# Patient Record
Sex: FEMALE | Race: WHITE | NOT HISPANIC OR LATINO
[De-identification: names, ages, dates, MRNs, and addresses within clinical notes are randomized per-mention and may not be internally consistent; named-entity substitution may affect disease eponyms.]

---

## 2017-07-07 ENCOUNTER — TRANSCRIPTION ENCOUNTER (OUTPATIENT)
Age: 32
End: 2017-07-07

## 2018-12-17 ENCOUNTER — TRANSCRIPTION ENCOUNTER (OUTPATIENT)
Age: 33
End: 2018-12-17

## 2020-02-25 ENCOUNTER — TRANSCRIPTION ENCOUNTER (OUTPATIENT)
Age: 35
End: 2020-02-25

## 2021-06-22 ENCOUNTER — TRANSCRIPTION ENCOUNTER (OUTPATIENT)
Age: 36
End: 2021-06-22

## 2021-09-20 ENCOUNTER — TRANSCRIPTION ENCOUNTER (OUTPATIENT)
Age: 36
End: 2021-09-20

## 2021-09-25 ENCOUNTER — TRANSCRIPTION ENCOUNTER (OUTPATIENT)
Age: 36
End: 2021-09-25

## 2021-11-07 ENCOUNTER — EMERGENCY (EMERGENCY)
Facility: HOSPITAL | Age: 36
LOS: 1 days | Discharge: HOME | End: 2021-11-07
Attending: EMERGENCY MEDICINE
Payer: COMMERCIAL

## 2021-11-07 VITALS
SYSTOLIC BLOOD PRESSURE: 109 MMHG | HEIGHT: 63 IN | WEIGHT: 188.05 LBS | DIASTOLIC BLOOD PRESSURE: 71 MMHG | RESPIRATION RATE: 16 BRPM | TEMPERATURE: 98 F | OXYGEN SATURATION: 95 % | HEART RATE: 88 BPM

## 2021-11-07 VITALS
SYSTOLIC BLOOD PRESSURE: 105 MMHG | HEART RATE: 84 BPM | TEMPERATURE: 98 F | OXYGEN SATURATION: 98 % | DIASTOLIC BLOOD PRESSURE: 67 MMHG | RESPIRATION RATE: 16 BRPM

## 2021-11-07 LAB
APPEARANCE UR: ABNORMAL
BACTERIA # UR AUTO: ABNORMAL
BILIRUB UR-MCNC: NEGATIVE — SIGNIFICANT CHANGE UP
COLOR SPEC: YELLOW — SIGNIFICANT CHANGE UP
DIFF PNL FLD: ABNORMAL
EPI CELLS # UR: 16 /HPF — HIGH
GLUCOSE UR QL: NEGATIVE — SIGNIFICANT CHANGE UP
KETONES UR-MCNC: NEGATIVE — SIGNIFICANT CHANGE UP
LEUKOCYTE ESTERASE UR-ACNC: ABNORMAL
NITRITE UR-MCNC: NEGATIVE — SIGNIFICANT CHANGE UP
PH UR: 6 — SIGNIFICANT CHANGE UP (ref 5–8)
PROT UR-MCNC: ABNORMAL
RBC CASTS # UR COMP ASSIST: 3 /HPF — SIGNIFICANT CHANGE UP (ref 0–4)
SP GR SPEC: 1.03 — HIGH (ref 1.01–1.02)
UROBILINOGEN FLD QL: ABNORMAL
WBC UR QL: 37 /HPF — HIGH (ref 0–5)

## 2021-11-07 PROCEDURE — 84702 CHORIONIC GONADOTROPIN TEST: CPT

## 2021-11-07 PROCEDURE — 93975 VASCULAR STUDY: CPT | Mod: 26

## 2021-11-07 PROCEDURE — 76830 TRANSVAGINAL US NON-OB: CPT | Mod: 26

## 2021-11-07 PROCEDURE — 80053 COMPREHEN METABOLIC PANEL: CPT

## 2021-11-07 PROCEDURE — 86850 RBC ANTIBODY SCREEN: CPT

## 2021-11-07 PROCEDURE — 85025 COMPLETE CBC W/AUTO DIFF WBC: CPT

## 2021-11-07 PROCEDURE — 93975 VASCULAR STUDY: CPT

## 2021-11-07 PROCEDURE — 99284 EMERGENCY DEPT VISIT MOD MDM: CPT | Mod: 25

## 2021-11-07 PROCEDURE — 36415 COLL VENOUS BLD VENIPUNCTURE: CPT

## 2021-11-07 PROCEDURE — 76830 TRANSVAGINAL US NON-OB: CPT

## 2021-11-07 PROCEDURE — 86901 BLOOD TYPING SEROLOGIC RH(D): CPT

## 2021-11-07 PROCEDURE — 99285 EMERGENCY DEPT VISIT HI MDM: CPT

## 2021-11-07 PROCEDURE — 86900 BLOOD TYPING SEROLOGIC ABO: CPT

## 2021-11-07 PROCEDURE — 81001 URINALYSIS AUTO W/SCOPE: CPT

## 2021-11-07 NOTE — ED PROVIDER NOTE - NSFOLLOWUPINSTRUCTIONS_ED_ALL_ED_FT
You were seen in the ED for lower abdominal pain.   The following labs/imaging were obtained: see attached (if applicable)  Continue home medications (if any).   Take Acetaminophen (Tylenol 1,000mg each 6-8hrs) AND /OR Ibuprofen (Motrin 600mg each 6-8hrs) for pain. Take Ibuprofen with meals.  Return to the ED if you develop fever, inability to tolerate fluids,  worsening or new concerning symptoms.  Follow up with your gynecologist in 2-3 days.   Discussed with pt results of work up, strict return precautions, and need for follow up.  Pt expressed understanding and agrees with plan. You were seen in the ED for lower abdominal pain.   The following labs/imaging were obtained: see attached (if applicable)  Continue home medications (if any).   Take Acetaminophen (Tylenol 1,000mg each 6-8hrs) AND /OR Ibuprofen (Motrin 600mg each 6-8hrs) for pain. Take Ibuprofen with meals.  Return to the ED if you develop fever, inability to tolerate fluids,  worsening or new concerning symptoms.  Follow up with your ob / gynecologist in 2-3 days.   Discussed with pt results of work up, strict return precautions, and need for follow up.  Pt expressed understanding and agrees with plan.

## 2021-11-07 NOTE — ED PROVIDER NOTE - NS ED ROS FT
GENERAL: No fever or chills  EYES: no change in vision  HEENT: no trouble swallowing or speaking  CARDIAC: no chest pain or palpitations   PULMONARY: no cough or SOB  GI: no abdominal pain, nausea, vomiting, diarrhea, or constipation   : see hpi   SKIN: no rashes  NEURO: no headache, numbness, or weakness.  MSK: No joint pain

## 2021-11-07 NOTE — ED PROVIDER NOTE - PATIENT PORTAL LINK FT
You can access the FollowMyHealth Patient Portal offered by [facility] by registering at the following website: [email or web address]. By joining [facility]’s FollowMyHealth portal, you will also be able to view your health information using other applications (apps) compatible with our system.

## 2021-11-07 NOTE — ED PROVIDER NOTE - PHYSICAL EXAMINATION
Gen: AAOx3, non-toxic  Head: NCAT  HEENT: EOMI, oral mucosa moist, normal conjunctiva  Lung: CTAB, no respiratory distress, no wheezes/rhonchi/rales B/L, speaking in full sentences  CV: RRR, no murmurs, rubs or gallops  Abd: suprapubic and Llq ttp, no guarding, no CVA tenderness  MSK: no visible deformities  Neuro: No focal sensory or motor deficits, normal CN exam   Skin: Warm, well perfused, no rash  Psych: normal affect.

## 2021-11-07 NOTE — CONSULT NOTE ADULT - ASSESSMENT
37yo , LMP ~Mid Aug here w/ c/o sudden onset suprpubic/LLQ abdominal pain. No cyst with a small amount of free fluid demonstrated on US.  Patient with overall benign abdomen although with some mild tenderness to palpation. Low concern for torsion at this time given benign abdomen and clinical picture with overall well apperaing, well mentating patient. Likely ruptured cyst.     -no acute gyn intervention  -would recommended UA to r/u UTI    Attila PGY2  d/w Dr. Velez         35yo , LMP ~Mid Aug here w/ c/o sudden onset suprpubic/LLQ abdominal pain. No cyst with a small amount of free fluid demonstrated on US.  Patient with overall benign abdomen although with some mild tenderness to palpation. Low concern for torsion at this time given benign abdomen and clinical picture with overall well apperaing, well mentating patient. Likely ruptured cyst.     -no acute gyn intervention  -would recommended UA to r/u UTI    Attila PGY2      TO BE DISCUSSED WITH ATTENDING         35yo , LMP ~Mid Aug here w/ c/o sudden onset suprpubic/LLQ abdominal pain. No cyst with a small amount of free fluid demonstrated on US.  Patient with overall benign abdomen although with some mild tenderness to palpation. Low concern for torsion at this time given benign abdomen and clinical picture with overall well apperaing, well mentating patient. Likely ruptured cyst.     -no acute gyn intervention  -would recommended UA to r/u UTI    Attila PGY2  d/w Dr. Soto

## 2021-11-07 NOTE — ED PROVIDER NOTE - CLINICAL SUMMARY MEDICAL DECISION MAKING FREE TEXT BOX
35 yo F, IVF with  last month, transferred from [facility] for gyn evaluation after initially presenting there with suprapubic ttp and vaginal spotting. Ddx retained products of conception vs ectopic pregnancy vs ovarian torsion. Basic labs, US, UA, hcg, gyn consult

## 2021-11-07 NOTE — ED PROVIDER NOTE - ATTENDING CONTRIBUTION TO CARE
Attending MD Fontanez:  I personally have seen and examined this patient.  Resident note reviewed and agree on plan of care and except where noted.  See HPI, PE, and MDM for details.      Pt transferred from OSH for evaluation of pelvic pain, undergoing IVF. Nontender abdomen here, TVUS without ovarian torsion. Suspected rupture ovarian cyst as etiology of pain per GYN evaluation. Patient's pain improved upon discharge. Instructed to follow up with GYN as outpatient.

## 2021-11-07 NOTE — ED ADULT NURSE NOTE - OBJECTIVE STATEMENT
37 YO female transfer from [place] for OB/GNY consult. Patient reports she has spontaneous  approx 1 month ago and has had continues lower abdominal pain and spotting. Patient denies saturating pads or passing clots. Patient went to [facility], was told that they do not perform transvag. ultrasounds at night and not capable of OB/GYN consult at that time. Patient is A&OX4, denies chest pain, SOB, HA, N/V/D, abdominal pain, fever/chills, urinary symptoms, hematuria, weakness, dizziness, numbness, tinging. Peripheral pulses present b/l. Skin warm, dry and pink. Pt placed in position of comfort. Pt educated on call bell system and provided call bell. Bed in lowest position, wheels locked, appropriate side rails raised. Pt denies needs at this time. 35 YO female transfer from [place] for OB/GNY consult. Patient reports she has spontaneous  approx 1 month ago was spotting for a few weeks with associated pain and spontaneously stopped. Patient reports that yesterday  lower abdominal pain and spotting returned. Patient denies saturating pads or passing clots. Patient went to [facility], was told that they do not perform transvag. ultrasounds at night and not capable of OB/GYN consult at that time. Patient is A&OX4, denies chest pain, SOB, HA, N/V/D, abdominal pain, fever/chills, urinary symptoms, hematuria, weakness, dizziness, numbness, tinging. Peripheral pulses present b/l. Skin warm, dry and pink. Pt placed in position of comfort. Pt educated on call bell system and provided call bell. Bed in lowest position, wheels locked, appropriate side rails raised. Pt denies needs at this time.

## 2021-11-07 NOTE — ED PROVIDER NOTE - OBJECTIVE STATEMENT
37 yo F, IVF with  last month, transferred from [facility] for gyn evaluation after she initially presented there with suprapubic ttp and vaginal spotting. Reports 37 yo F, IVF with  last month, transferred from [facility] for gyn evaluation after initially presenting there with suprapubic ttp and vaginal spotting. Reports vaginal spotting after spontanous  4 weeks ago resolving 2 weeks ago. Now with recurrent vaginal spotting and suprapubic ttp. 10/10 pressure, non-radiating, improved after oxycodone from [facility]. No fevers. No vomiting. No diarrhea. No vaginal discharge. No urinary symptoms.

## 2021-11-07 NOTE — CONSULT NOTE ADULT - SUBJECTIVE AND OBJECTIVE BOX
R2 GYN ED CONSULT NOTE    SUBJECTIVE:    35yo , LMP ~Mid Aug, transfer from [facility] for r/o ovarian torsion after presenting with LLQ/suprapubic pain x 1 day. Pt recently underwent IVF embryo transfer on  that resulted in a MAB. Pt reports she took cytotec on Oct 13th and was being follow by her [facility] doctor Dr. Berg, with TVUS and betahcg levels. Most recent TVUS was on  and was told she had a small cyst. Beta at the time was 8.     Pt reports that during the day on Sat she developed a sharp, 10/10, constant, nonradiating, LLQ/suprapubic pain. Pt denies nausea or vomiting. Pain has since improved. Pt also reports light vaginal spotting for the past 2 days, <1 pad/day.     Pt denies fever, chills,, diarrhea, headache, constipation, dizzyness, syncope, chest pain, palpitations, shortness of breath, dysuria, urgency, frequency, abdominal/pelvic pain,   In ED today pt received tylenol and oxycodone.     Primary OB/GYN: Dr. Benítez  OBH: MAB  GYNH: +hx of septate uterus s/p partial resection denies h/o AUB, fibroids, ovarian cysts, STDs, or abnormal Pap smears  PMH: anemia, currently being treated with iron transfusions,  depression/anxiety  PSH: denies  Social History:  Denies smoking use, drug use, alcohol use.   +occasional social alcohol use  All: Azithromycin  Meds: Lexapro, iron, PNV    PMSH:  PAST MEDICAL & SURGICAL HISTORY:  Depression    Anxiety    No significant past surgical history        Allergies    erythromycin (Unknown)  Imitrex (Hives; Rash)    Intolerances        Medications:  Home:  MEDICATIONS  (STANDING):    MEDICATIONS  (PRN):        FAMILY HISTORY:  No pertinent family history in first degree relatives                          OBJECTIVE:    VITAL SIGNS:  Vital Signs Last 24 Hrs  T(C): 36.5 (2021 01:08), Max: 36.5 (2021 01:08)  T(F): 97.7 (2021 01:08), Max: 97.7 (2021 01:08)  HR: 88 (2021 01:08) (88 - 88)  BP: 109/71 (2021 01:08) (109/71 - 109/71)  BP(mean): --  RR: 16 (2021 01:08) (16 - 16)  SpO2: 95% (2021 01:08) (95% - 95%)  Height (cm): 160 (21 @ 01:08)  Weight (kg): 85.3 (21 @ 01:08)  BMI (kg/m2): 33.3 (21 @ 01:08)  BSA (m2): 1.88 (21 @ 01:08)  CAPILLARY BLOOD GLUCOSE            Physical Exam:   General: sitting comftorably in bed, NAD   HEENT: neck supple, full ROM  CV: RR S1S2 no m/r/g  Lungs: CTA b/l, good air flow b/l   Back: No CVA tenderness  Abd: Soft, mild suprapubic tenderness, non-distended.  Bowel sounds present.    :  No bleeding on pad.    External labia wnl.  Bimanual exam with no cervical motion tenderness, uterus wnl, mild  left adnexal tenderness.  No guarding. No rebound.  Ext: non-tender b/l, no edema     LABS:                        9.9    9.24  )-----------( 258      ( 2021 01:15 )             32.8   baso 0.5    eos 3.1    imm gran 0.2    lymph 33.1   mono 8.9    poly 54.2           139  |  108  |  8   ----------------------------<  106<H>  3.4<L>   |  21<L>  |  0.63    Ca    8.5      2021 01:15    TPro  5.7<L>  /  Alb  3.4  /  TBili  0.2  /  DBili  x   /  AST  28  /  ALT  40  /  AlkPhos  48            RADIOLOGY:  < from: US Transvaginal (21 @ 01:49) >    EXAM:  US DPLX PELVIC                          EXAM:  US TRANSVAGINAL                            PROCEDURE DATE:  2021            INTERPRETATION:  CLINICAL INFORMATION: Left lower quadrant tenderness. Status post miscarriage approximately onemonth prior. History of pelvic cyst    LMP: Not reported    COMPARISON: None available.    TECHNIQUE:  Endovaginal and transabdominal pelvic sonogram. Color and Spectral Doppler was performed.    FINDINGS:    Uterus: 7.4 cm x 4.5 cm x 6.2 cm. Septateuterus.  Endometrium: 11 mm. Within normal limits.    Right ovary: 1.8 cm x 1.5 cm x 1.2 cm. 1.3 cm dominant follicle. Arterial and venous waveforms documented.  Left ovary: 3.7 x 1.8 x 2.0. 1.3 cm dominant follicle. Arterial and venous waveforms documented.    Fluid: Small free fluid in the cul-de-sac.    IMPRESSION:    Unremarkable sonographic appearance of the ovaries. Small volume pelvic free fluid.    Septate uterus.    --- End of Report ---              ELVIS HAILE MD; Resident Radiology  This document has been electronically signed.  HARVINDER MARCH DO; Attending Radiologist  This document has been electronically signed. 2021  2:33AM    < end of copied text >

## 2022-01-31 ENCOUNTER — TRANSCRIPTION ENCOUNTER (OUTPATIENT)
Age: 37
End: 2022-01-31

## 2022-02-16 ENCOUNTER — EMERGENCY (EMERGENCY)
Facility: HOSPITAL | Age: 37
LOS: 1 days | Discharge: HOME | End: 2022-02-16
Attending: EMERGENCY MEDICINE
Payer: COMMERCIAL

## 2022-02-16 VITALS
TEMPERATURE: 98 F | SYSTOLIC BLOOD PRESSURE: 118 MMHG | WEIGHT: 190.04 LBS | RESPIRATION RATE: 17 BRPM | HEART RATE: 82 BPM | HEIGHT: 63 IN | DIASTOLIC BLOOD PRESSURE: 86 MMHG | OXYGEN SATURATION: 97 %

## 2022-02-16 LAB
ALBUMIN SERPL ELPH-MCNC: 4.1 G/DL — SIGNIFICANT CHANGE UP (ref 3.3–5)
ALP SERPL-CCNC: 50 U/L — SIGNIFICANT CHANGE UP (ref 40–120)
ALT FLD-CCNC: 21 U/L — SIGNIFICANT CHANGE UP (ref 10–45)
ANION GAP SERPL CALC-SCNC: 13 MMOL/L — SIGNIFICANT CHANGE UP (ref 5–17)
APPEARANCE UR: ABNORMAL
AST SERPL-CCNC: 15 U/L — SIGNIFICANT CHANGE UP (ref 10–40)
BACTERIA # UR AUTO: NEGATIVE — SIGNIFICANT CHANGE UP
BASE EXCESS BLDV CALC-SCNC: -2 MMOL/L — SIGNIFICANT CHANGE UP (ref -2–2)
BASOPHILS # BLD AUTO: 0.04 K/UL — SIGNIFICANT CHANGE UP (ref 0–0.2)
BASOPHILS NFR BLD AUTO: 0.3 % — SIGNIFICANT CHANGE UP (ref 0–2)
BILIRUB SERPL-MCNC: 0.2 MG/DL — SIGNIFICANT CHANGE UP (ref 0.2–1.2)
BILIRUB UR-MCNC: NEGATIVE — SIGNIFICANT CHANGE UP
BLOOD GAS VENOUS - CREATININE: SIGNIFICANT CHANGE UP MG/DL (ref 0.5–1.3)
BUN SERPL-MCNC: 15 MG/DL — SIGNIFICANT CHANGE UP (ref 7–23)
CA-I SERPL-SCNC: 1.21 MMOL/L — SIGNIFICANT CHANGE UP (ref 1.15–1.33)
CALCIUM SERPL-MCNC: 9.2 MG/DL — SIGNIFICANT CHANGE UP (ref 8.4–10.5)
CHLORIDE BLDV-SCNC: 104 MMOL/L — SIGNIFICANT CHANGE UP (ref 96–108)
CHLORIDE SERPL-SCNC: 106 MMOL/L — SIGNIFICANT CHANGE UP (ref 96–108)
CO2 BLDV-SCNC: 27 MMOL/L — HIGH (ref 22–26)
CO2 SERPL-SCNC: 21 MMOL/L — LOW (ref 22–31)
COLOR SPEC: SIGNIFICANT CHANGE UP
CREAT SERPL-MCNC: 0.84 MG/DL — SIGNIFICANT CHANGE UP (ref 0.5–1.3)
DIFF PNL FLD: ABNORMAL
EOSINOPHIL # BLD AUTO: 0.3 K/UL — SIGNIFICANT CHANGE UP (ref 0–0.5)
EOSINOPHIL NFR BLD AUTO: 2.6 % — SIGNIFICANT CHANGE UP (ref 0–6)
EPI CELLS # UR: 3 /HPF — SIGNIFICANT CHANGE UP
GAS PNL BLDV: 136 MMOL/L — SIGNIFICANT CHANGE UP (ref 136–145)
GAS PNL BLDV: SIGNIFICANT CHANGE UP
GAS PNL BLDV: SIGNIFICANT CHANGE UP
GLUCOSE BLDV-MCNC: 97 MG/DL — SIGNIFICANT CHANGE UP (ref 70–99)
GLUCOSE SERPL-MCNC: 107 MG/DL — HIGH (ref 70–99)
GLUCOSE UR QL: NEGATIVE — SIGNIFICANT CHANGE UP
HCG SERPL-ACNC: HIGH MIU/ML
HCO3 BLDV-SCNC: 25 MMOL/L — SIGNIFICANT CHANGE UP (ref 22–29)
HCT VFR BLD CALC: 42.9 % — SIGNIFICANT CHANGE UP (ref 34.5–45)
HCT VFR BLDA CALC: 42 % — SIGNIFICANT CHANGE UP (ref 34.5–46.5)
HGB BLD CALC-MCNC: 14 G/DL — SIGNIFICANT CHANGE UP (ref 11.7–16.1)
HGB BLD-MCNC: 13.9 G/DL — SIGNIFICANT CHANGE UP (ref 11.5–15.5)
HYALINE CASTS # UR AUTO: 1 /LPF — SIGNIFICANT CHANGE UP (ref 0–2)
IMM GRANULOCYTES NFR BLD AUTO: 0.3 % — SIGNIFICANT CHANGE UP (ref 0–1.5)
KETONES UR-MCNC: NEGATIVE — SIGNIFICANT CHANGE UP
LACTATE BLDV-MCNC: 1.3 MMOL/L — SIGNIFICANT CHANGE UP (ref 0.7–2)
LEUKOCYTE ESTERASE UR-ACNC: NEGATIVE — SIGNIFICANT CHANGE UP
LYMPHOCYTES # BLD AUTO: 2.92 K/UL — SIGNIFICANT CHANGE UP (ref 1–3.3)
LYMPHOCYTES # BLD AUTO: 25.3 % — SIGNIFICANT CHANGE UP (ref 13–44)
MCHC RBC-ENTMCNC: 29.4 PG — SIGNIFICANT CHANGE UP (ref 27–34)
MCHC RBC-ENTMCNC: 32.4 GM/DL — SIGNIFICANT CHANGE UP (ref 32–36)
MCV RBC AUTO: 90.7 FL — SIGNIFICANT CHANGE UP (ref 80–100)
MONOCYTES # BLD AUTO: 0.83 K/UL — SIGNIFICANT CHANGE UP (ref 0–0.9)
MONOCYTES NFR BLD AUTO: 7.2 % — SIGNIFICANT CHANGE UP (ref 2–14)
NEUTROPHILS # BLD AUTO: 7.4 K/UL — SIGNIFICANT CHANGE UP (ref 1.8–7.4)
NEUTROPHILS NFR BLD AUTO: 64.3 % — SIGNIFICANT CHANGE UP (ref 43–77)
NITRITE UR-MCNC: NEGATIVE — SIGNIFICANT CHANGE UP
NRBC # BLD: 0 /100 WBCS — SIGNIFICANT CHANGE UP (ref 0–0)
PCO2 BLDV: 51 MMHG — HIGH (ref 39–42)
PH BLDV: 7.3 — LOW (ref 7.32–7.43)
PH UR: 6 — SIGNIFICANT CHANGE UP (ref 5–8)
PLATELET # BLD AUTO: 219 K/UL — SIGNIFICANT CHANGE UP (ref 150–400)
PO2 BLDV: 20 MMHG — LOW (ref 25–45)
POTASSIUM BLDV-SCNC: 4 MMOL/L — SIGNIFICANT CHANGE UP (ref 3.5–5.1)
POTASSIUM SERPL-MCNC: 4.6 MMOL/L — SIGNIFICANT CHANGE UP (ref 3.5–5.3)
POTASSIUM SERPL-SCNC: 4.6 MMOL/L — SIGNIFICANT CHANGE UP (ref 3.5–5.3)
PROT SERPL-MCNC: 6.8 G/DL — SIGNIFICANT CHANGE UP (ref 6–8.3)
PROT UR-MCNC: ABNORMAL
RBC # BLD: 4.73 M/UL — SIGNIFICANT CHANGE UP (ref 3.8–5.2)
RBC # FLD: 12.8 % — SIGNIFICANT CHANGE UP (ref 10.3–14.5)
RBC CASTS # UR COMP ASSIST: 1446 /HPF — HIGH (ref 0–4)
SAO2 % BLDV: 27.6 % — LOW (ref 67–88)
SODIUM SERPL-SCNC: 140 MMOL/L — SIGNIFICANT CHANGE UP (ref 135–145)
SP GR SPEC: 1.03 — HIGH (ref 1.01–1.02)
UROBILINOGEN FLD QL: NEGATIVE — SIGNIFICANT CHANGE UP
WBC # BLD: 11.52 K/UL — HIGH (ref 3.8–10.5)
WBC # FLD AUTO: 11.52 K/UL — HIGH (ref 3.8–10.5)
WBC UR QL: 3 /HPF — SIGNIFICANT CHANGE UP (ref 0–5)

## 2022-02-16 PROCEDURE — 76817 TRANSVAGINAL US OBSTETRIC: CPT | Mod: 26

## 2022-02-16 PROCEDURE — 99285 EMERGENCY DEPT VISIT HI MDM: CPT

## 2022-02-16 RX ORDER — ACETAMINOPHEN 500 MG
975 TABLET ORAL ONCE
Refills: 0 | Status: COMPLETED | OUTPATIENT
Start: 2022-02-16 | End: 2022-02-16

## 2022-02-16 RX ADMIN — Medication 975 MILLIGRAM(S): at 22:53

## 2022-02-16 NOTE — ED PROVIDER NOTE - ATTENDING CONTRIBUTION TO CARE
37 yo female @ 6 weeks via IVF p/w vaginal bleeding.  concern for fetal demise on ultrasound.  will check labs, OB consult and reassess.

## 2022-02-16 NOTE — ED ADULT NURSE REASSESSMENT NOTE - NS ED NURSE REASSESS COMMENT FT1
Pt lying in bed. A&O x 4. Had just previously received pain medication. VSS. Awaiting OB consult and dispo.

## 2022-02-16 NOTE — ED PROVIDER NOTE - PROGRESS NOTE DETAILS
Fely Garza MD (PGY2) -  Pt seen & reassessed.  Pt symptomatically improved.  NAD, pt tolerated PO.  We discussed the results of ED w/u w/patient (incl. presumptive Emergency Department dx, associated anticipatory guidance, stressing importance of prompt f/u, return precautions), & gave them a copy of results.  Patient verbalized understanding of ED course & agreed with our f/u recommendations, has decisional making capacity.  Pt st they will f/u w/ OB within the next 3 days; pt agrees to call today or tomorrow for an appointment. Pt agrees to return to the ED if there is any worsening or concerning symptoms.

## 2022-02-16 NOTE — ED ADULT NURSE NOTE - OBJECTIVE STATEMENT
36y F no PMH walk in from triage c/o vaginal bleeding. Patient states she is 6 weeks pregnant and received embryo transfer in-vitro fertilization. She had prior miscarriage in october 2021. She Denies lightheadedness, fatigue, increased HR. Patient is tearful, abdomen soft, non-distended. IV inserted in qdoc, partner at bedside. safety and comfort being maintained.

## 2022-02-16 NOTE — ED PROVIDER NOTE - OBJECTIVE STATEMENT
37 y/o F , currently 6 weeks gestation via IVF, hx of miscarriage in 2021, anemia (baseline H&H 10/33, as per pt) presenting to the ED with vaginal bleeding starting at 1800 today, with associated abdominal cramping and blood clots. Pt has been following with Endocrinology at [facility] for IVF. Denies fevers, chills, chest pain, SOB, nausea, vomiting, lightheadedness, dizziness. OBGYN Dr. Lozano.

## 2022-02-16 NOTE — ED PROVIDER NOTE - RAPID ASSESSMENT
36y F 6 weeks gestation via embryo transfer in-vitro fertilization with PMHx of miscarriage in October 2021 presents to the ED with vaginal bleeding x2 hours with intermittent cramping. Denies lightheadedness, fatigue, increased HR. Patient is in mild distress secondary to situation, tearful.    Endocrinologist: Dr. Fabiola Berg   Scribe Statement: Ahsan BERG Grace, attest that this documentation has been prepared under the direction and in the presence of Ezio Tay) 36y F 6 weeks gestation via embryo transfer in-vitro fertilization with PMHx of miscarriage in October 2021 presents to the ED with vaginal bleeding x2 hours with intermittent cramping. Denies lightheadedness, fatigue, increased HR. Patient is in mild distress secondary to situation, tearful.    Endocrinologist: Dr. Fabiola Fernandoiblori Statement: I, Aishwarya Foreman, attest that this documentation has been prepared under the direction and in the presence of Ezio Tay)  Ezio Tay MD note: The scribe's documentation has been prepared under my direction and personally reviewed by me.  Patient was seen as a tele QDOC patient, the patient will be seen and further worked up in the main emergency department and their care will be completed by the main emergency department team along with a thorough physical exam. Receiving team will follow up on labs, analgesia, any clinical imaging, reassess and disposition as clinically indicated, all decisions regarding the progression of care will be made at their discretion.

## 2022-02-16 NOTE — ED PROVIDER NOTE - PATIENT PORTAL LINK FT
You can access the FollowMyHealth Patient Portal offered by [facility] by registering at the following website: [email or web address]. By joining [facility]’s FollowMyHealth portal, you will also be able to view your health information using other applications (apps) compatible with our system.

## 2022-02-16 NOTE — ED PROVIDER NOTE - NSFOLLOWUPINSTRUCTIONS_ED_ALL_ED_FT
You were seen in the emergency department for: vaginal bleeding  Your diagnosis for this visit was: miscarriage      Please return to the Emergency Department if you experience any of the following symptoms:   - Bleeding through more than 2 pads per hour   - Shortness of breath or trouble breathing  - Pressure, pain or tightness in the chest  - Face drooping, arm weakness or speech difficulty  - Persistence of severe vomiting  - Head injury or loss of consciousness  - Nonstop bleeding or an open wound    (1) Follow up with your gynecologist and primary care physician within the next 24-48 hours as discussed. In addition, we did not find evidence of a life threatening illness on your testing here today, but listed below are the specialists that will be necessary to see as an outpatient to continue the workup.  Please call the numbers listed below or [phone] to set up the necessary appointments.  (2) Take Tylenol (up to 1000mg or 1 g)  and/or Motrin (up to 600mg) up to every 6 hours as needed for pain.   (3) If you had an IV (intravenous) line placed, it was removed. Sometimes, after IV removal, that area can be tender for a few days; if it develops redness and swelling, those could be signs of infection; in which case, return to the Emergency Department for assessment.  (4) Please continue taking all of your home medications as directed.      The medication you were given in the ED:   Diclofenac/Misoprostol (Arthrotec, Arthrotec 75) - (By mouth)   Why this medicine is used: Miscarriage     Contact a nurse or doctor right away if you have:   •Blistering, peeling, or red skin rash  •Chest pain, trouble breathing, unusual sweating, fainting, fast, slow, or uneven heartbeat  •Dark urine or pale stools, yellow skin or eyes, change in how much or how often you urinate  •Severe stomach pain or diarrhea, red or black stools, vomiting blood or material that looks like coffee grounds  •Rapid weight gain, swelling in your hands, ankles, or feet, fever, neck pain or stiffness  •Numbness or weakness in your arm or leg, or on one side of your body  •Headache, or problems with vision, speech, or walking, lightheadedness  •Unusual bleeding or bruising, including heavy vaginal bleeding    You can use 500-1000mg Tylenol every 6 hours for pain - as needed.  This is an over-the-counter medications - please respect the warnings on the label. This medication come with certain risks and side effects that you need to discuss with your doctor, especially if you are taking it for a prolonged period.    You can use 400-600mg Ibuprofen (such as motrin or advil) every 6 to 8 hours as needed for pain control.  Take ibuprofen with food or milk to lessen stomach upset.  This is an over-the-counter medication please respect the warnings on the label. All medications come with certain risks and side effects that you need to discuss with your doctor, especially if you are taking them for a prolonged period.

## 2022-02-16 NOTE — ED ADULT TRIAGE NOTE - CHIEF COMPLAINT QUOTE
Patient is 6 weeks pregnant, states that she is having similar symptoms to the last time she miscarried. Patient has been bleeding x 2 hours, states that she didn't notice clots. States that she is having intermittent cramping. States that symptoms are similar to the last time she had a miscarriage.

## 2022-02-17 VITALS
HEART RATE: 71 BPM | DIASTOLIC BLOOD PRESSURE: 70 MMHG | RESPIRATION RATE: 16 BRPM | OXYGEN SATURATION: 100 % | SYSTOLIC BLOOD PRESSURE: 102 MMHG

## 2022-02-17 LAB
BLD GP AB SCN SERPL QL: NEGATIVE — SIGNIFICANT CHANGE UP
RH IG SCN BLD-IMP: POSITIVE — SIGNIFICANT CHANGE UP
SARS-COV-2 RNA SPEC QL NAA+PROBE: SIGNIFICANT CHANGE UP

## 2022-02-17 PROCEDURE — 84132 ASSAY OF SERUM POTASSIUM: CPT

## 2022-02-17 PROCEDURE — 85014 HEMATOCRIT: CPT

## 2022-02-17 PROCEDURE — 87086 URINE CULTURE/COLONY COUNT: CPT

## 2022-02-17 PROCEDURE — 84702 CHORIONIC GONADOTROPIN TEST: CPT

## 2022-02-17 PROCEDURE — 83605 ASSAY OF LACTIC ACID: CPT

## 2022-02-17 PROCEDURE — 84295 ASSAY OF SERUM SODIUM: CPT

## 2022-02-17 PROCEDURE — 82565 ASSAY OF CREATININE: CPT

## 2022-02-17 PROCEDURE — 81001 URINALYSIS AUTO W/SCOPE: CPT

## 2022-02-17 PROCEDURE — 99284 EMERGENCY DEPT VISIT MOD MDM: CPT | Mod: 25

## 2022-02-17 PROCEDURE — 82947 ASSAY GLUCOSE BLOOD QUANT: CPT

## 2022-02-17 PROCEDURE — 96374 THER/PROPH/DIAG INJ IV PUSH: CPT

## 2022-02-17 PROCEDURE — 80053 COMPREHEN METABOLIC PANEL: CPT

## 2022-02-17 PROCEDURE — 86901 BLOOD TYPING SEROLOGIC RH(D): CPT

## 2022-02-17 PROCEDURE — 82435 ASSAY OF BLOOD CHLORIDE: CPT

## 2022-02-17 PROCEDURE — U0005: CPT

## 2022-02-17 PROCEDURE — U0003: CPT

## 2022-02-17 PROCEDURE — 86900 BLOOD TYPING SEROLOGIC ABO: CPT

## 2022-02-17 PROCEDURE — 96375 TX/PRO/DX INJ NEW DRUG ADDON: CPT

## 2022-02-17 PROCEDURE — 85018 HEMOGLOBIN: CPT

## 2022-02-17 PROCEDURE — 85025 COMPLETE CBC W/AUTO DIFF WBC: CPT

## 2022-02-17 PROCEDURE — 82330 ASSAY OF CALCIUM: CPT

## 2022-02-17 PROCEDURE — 86850 RBC ANTIBODY SCREEN: CPT

## 2022-02-17 PROCEDURE — 76817 TRANSVAGINAL US OBSTETRIC: CPT

## 2022-02-17 PROCEDURE — 36415 COLL VENOUS BLD VENIPUNCTURE: CPT

## 2022-02-17 PROCEDURE — 82803 BLOOD GASES ANY COMBINATION: CPT

## 2022-02-17 RX ORDER — ONDANSETRON 8 MG/1
4 TABLET, FILM COATED ORAL ONCE
Refills: 0 | Status: COMPLETED | OUTPATIENT
Start: 2022-02-17 | End: 2022-02-17

## 2022-02-17 RX ORDER — ACETAMINOPHEN 500 MG
975 TABLET ORAL ONCE
Refills: 0 | Status: COMPLETED | OUTPATIENT
Start: 2022-02-17 | End: 2022-02-17

## 2022-02-17 RX ORDER — KETOROLAC TROMETHAMINE 30 MG/ML
15 SYRINGE (ML) INJECTION ONCE
Refills: 0 | Status: DISCONTINUED | OUTPATIENT
Start: 2022-02-17 | End: 2022-02-17

## 2022-02-17 RX ADMIN — ONDANSETRON 4 MILLIGRAM(S): 8 TABLET, FILM COATED ORAL at 00:56

## 2022-02-17 RX ADMIN — Medication 15 MILLIGRAM(S): at 00:56

## 2022-02-17 RX ADMIN — Medication 975 MILLIGRAM(S): at 02:46

## 2022-02-17 RX ADMIN — Medication 975 MILLIGRAM(S): at 01:07

## 2022-02-17 NOTE — CONSULT NOTE ADULT - SUBJECTIVE AND OBJECTIVE BOX
GYN Consult Note    HPI:  36y  @7w1d by 5day embryo transfer on  presents with heavy vaginal bleeding and cramping. Pt had acute onset of vaginal bleeding and cramping starting at 2pm today. The patient passed multiple large clots and felt a loss of breast tenderness. Per pt, these were the same symptoms she had with her last miscarriage so she came to the ED for evaluation.     From ED, pt sent to ultrasound where she reports the tech noted an IUP with a +FH then witnessed the miscarriage with the fetus passing from uterus. After the patient still notes vaginal bleeding with passage of clots ranging from quater to fist size. She still note cramping, with mild relief from the Tylenol given. Denies dizziness, syncope, palpitations, chest pain, nausea, vomiting, fevers or chills.     PNC: Day 5 embryo transfer at [place] with joseph Amador     Name of GYN Physician: Jeyson     ObHx: MAB s/p dilation and curettage on 10/2021    GynHx: H/o hemorrhagic cyst, resolved. Septate uterus. Denies fibroids, cysts, endometriosis, STI's, abnormal pap smears.    PMHx: Anemia s/p IV Fe   PSHx: Septate uterus s/p hysteroscopic resection; D&C  Meds: Lexapro 30mg qd  All: Azithromycin (unknown)  FH: No h/o bleeding/clotting disorders   SH:  Pt with wife, undergoing recripicoal transfer.      REVIEW OF SYSTEMS  General: denies fevers, chills, tiredness  Skin/Breast: denies breast pain  Respiratory and Thorax: denies shortness of breath, denies cough  Cardiovascular: denies chest pain and denies palpitations  Gastrointestinal: denies nausea/ vomiting	  Genitourinary: denies dysuria, increased urinary frequency, urgency	  Constitutional, Cardiovascular, Respiratory, Gastrointestinal, Genitourinary, Musculoskeletal and Integumentary review of systems are normal except as noted. 	      Vital Signs Last 24 Hrs  T(C): 36.8 (2022 23:32), Max: 36.8 (2022 23:32)  T(F): 98.2 (2022 23:32), Max: 98.2 (2022 23:32)  HR: 80 (2022 23:32) (80 - 82)  BP: 101/67 (2022 23:32) (101/67 - 118/86)  BP(mean): --  RR: 18 (2022 23:32) (17 - 18)  SpO2: 100% (2022 23:32) (97% - 100%)        PHYSICAL EXAM:   Gen: Comfortable, NAD  Psych: appropriate mood & affect  CV: RRR  Pulm: CTAB  Abd: Soft, ND, mild suprapubic tenderness   Ext: Warm & well perfused. No edema or tenderness bilaterally  Pelvic: Normal external genitalia, urethra, clitoris, and anus. Normal labia bilaterally. Normal pattern of hair growth along pubic area. Normal-appearing skin, no lesion, no masses.  Spec Exam: Mix of dark brown and bright red blood in vault. Clot evacuated with minimal active bleeding from os. Normal vaginal epithelium, normal appearing cervix.   Bimanual exam: Retroverted uterus, nontender. No adenxal fullness or masses. 1cm dilated.     LABS:                        13.9   11.52 )-----------( 219      ( 2022 20:44 )             42.9     02-16    140  |  106  |  15  ----------------------------<  107<H>  4.6   |  21<L>  |  0.84    Ca    9.2      2022 20:44    TPro  6.8  /  Alb  4.1  /  TBili  0.2  /  DBili  x   /  AST  15  /  ALT  21  /  AlkPhos  50  02-16      Urinalysis Basic - ( 2022 20:44 )    Color: LIGHT BROWN / Appearance: Slightly Turbid / S.029 / pH: x  Gluc: x / Ketone: Negative  / Bili: Negative / Urobili: Negative   Blood: x / Protein: 30 mg/dL / Nitrite: Negative   Leuk Esterase: Negative / RBC: 1446 /hpf / WBC 3 /HPF   Sq Epi: x / Non Sq Epi: 3 /hpf / Bacteria: Negative              RADIOLOGY & ADDITIONAL STUDIES:     GYN Consult Note    HPI:  36y  @7w1d by 5day embryo transfer on  presents with heavy vaginal bleeding and cramping. Pt had acute onset of vaginal bleeding and cramping starting at 2pm today. The patient passed multiple large clots and felt a loss of breast tenderness. Per pt, these were the same symptoms she had with her last miscarriage so she came to the ED for evaluation.     From ED, pt sent to ultrasound where she reports the tech noted an IUP with a +FH then witnessed the miscarriage with the fetus passing from uterus. After the patient still notes vaginal bleeding with passage of clots ranging from quater to fist size. She still note cramping, with mild relief from the Tylenol given. Denies dizziness, syncope, palpitations, chest pain, nausea, vomiting, fevers or chills.     PNC: Day 5 embryo transfer at [place] with joseph Amador     Name of GYN Physician: Jeyson     ObHx: MAB s/p dilation and curettage on 10/2021    GynHx: H/o hemorrhagic cyst, resolved. Septate uterus. Denies fibroids, cysts, endometriosis, STI's, abnormal pap smears.    PMHx: Anemia s/p IV Fe   PSHx: Septate uterus s/p hysteroscopic resection; D&C  Meds: Lexapro 30mg qd  All: Azithromycin (unknown)  FH: No h/o bleeding/clotting disorders   SH:  Pt with wife, undergoing recripicoal transfer.      REVIEW OF SYSTEMS  General: denies fevers, chills, tiredness  Skin/Breast: denies breast pain  Respiratory and Thorax: denies shortness of breath, denies cough  Cardiovascular: denies chest pain and denies palpitations  Gastrointestinal: denies nausea/ vomiting	  Genitourinary: denies dysuria, increased urinary frequency, urgency	  Constitutional, Cardiovascular, Respiratory, Gastrointestinal, Genitourinary, Musculoskeletal and Integumentary review of systems are normal except as noted. 	      Vital Signs Last 24 Hrs  T(C): 36.8 (2022 23:32), Max: 36.8 (2022 23:32)  T(F): 98.2 (2022 23:32), Max: 98.2 (2022 23:32)  HR: 80 (2022 23:32) (80 - 82)  BP: 101/67 (2022 23:32) (101/67 - 118/86)  BP(mean): --  RR: 18 (2022 23:32) (17 - 18)  SpO2: 100% (2022 23:32) (97% - 100%)        PHYSICAL EXAM:   Gen: Comfortable, NAD  Psych: appropriate mood & affect  CV: RRR  Pulm: CTAB  Abd: Soft, ND, mild suprapubic tenderness   Ext: Warm & well perfused. No edema or tenderness bilaterally  Pelvic: Normal external genitalia, urethra, clitoris, and anus. Normal labia bilaterally. Normal pattern of hair growth along pubic area. Normal-appearing skin, no lesion, no masses.  Spec Exam: Mix of dark brown and bright red blood in vault. Clot evacuated with minimal active bleeding from os. Normal vaginal epithelium, normal appearing cervix.   Bimanual exam: Retroverted uterus, nontender. No adenxal fullness or masses. 1cm dilated.     LABS:                        13.9   11.52 )-----------( 219      ( 2022 20:44 )             42.9     02-16    140  |  106  |  15  ----------------------------<  107<H>  4.6   |  21<L>  |  0.84    Ca    9.2      2022 20:44    TPro  6.8  /  Alb  4.1  /  TBili  0.2  /  DBili  x   /  AST  15  /  ALT  21  /  AlkPhos  50  02-16      Urinalysis Basic - ( 2022 20:44 )    Color: LIGHT BROWN / Appearance: Slightly Turbid / S.029 / pH: x  Gluc: x / Ketone: Negative  / Bili: Negative / Urobili: Negative   Blood: x / Protein: 30 mg/dL / Nitrite: Negative   Leuk Esterase: Negative / RBC: 1446 /hpf / WBC 3 /HPF   Sq Epi: x / Non Sq Epi: 3 /hpf / Bacteria: Negative              RADIOLOGY & ADDITIONAL STUDIES:    < from: US Transvaginal, OB (22 @ 22:06) >    ACC: [id number] EXAM:  US OB TRANSVAGINAL                          PROCEDURE DATE:  2022          INTERPRETATION:  CLINICAL INFORMATION: Pregnant with heavy bleeding and   cramping. IUP on prior outside sonogram. Beta hcg [id number].    LMP: 2022    Estimated Gestational Age by LMP: 6 weeks 3 days    COMPARISON: Pelvic ultrasound from 2021.    TECHNIQUE: Endovaginal and transabdominal pelvic sonogram.    FINDINGS:  Uterus: 9.3 x 6.1 x 7.0 cm. Single intrauterine gestational sac   visualized at the beginning of the examination, but absent at the end of   the examination.    Gestational Sac Size (mean): N/A  Gestations Sac Shape: Irregular.  Crown Rump Length: 7 mm.  Estimated Gestational Age: 6 weeks and 4 days by crown rump length.  Yolk Sac: 4 mm.  Fetal Heart Rate: 111 bpm.  (All visualized at the beginning of the examination, but absent at the   end of the examination).    Right ovary: Not visualized.  Left ovary: Not visualized.    Fluid: None.    IMPRESSION:  Single viable intrauterine gestation at the beginning of the examination   with absence of the gestation at the end of the examination compatible   with interval miscarriage.    --- End of Report ---          TANNER HELMS MD; Resident Radiologist  This documenthas been electronically signed.  NILA HARDING MD; Attending Radiologist  This document has been electronically signed. 2022 12:55AM    < end of copied text >

## 2022-02-17 NOTE — CONSULT NOTE ADULT - ASSESSMENT
A/P: 36y  @7w1d by 5day embryo transfer on  presents with heavy vaginal bleeding and cramping with ultrasound findings concerning for spontaneous  / anembryonic pregnancy. Pt with persistent heavy vaginal bleeding, though no significant hemorrhage on exam and VSS. Discussed management of persistent vaginal bleeding including dilation and curettage, medical management and expectant management. Pt desires medical management.   - Cytotec 800 buyccal - 400mg in eachj check, allow to dissolve for 30 minutes then swallow the rest  - Zofran for N/V  - Toradol for cramps   - Can consider repeat CBC if change in clinical status  - Rh +        Kierra Gaytan, PGY-2    D/W Dr Prajapati    A/P: 36y  @7w1d by 5day embryo transfer on  presents with heavy vaginal bleeding and cramping with ultrasound findings showing  spontaneous  / anembryonic pregnancy. Pt with persistent heavy vaginal bleeding, though no significant hemorrhage on exam and VSS. Discussed management of persistent vaginal bleeding including dilation and curettage, medical management and expectant management. Pt desires medical management.   - Cytotec 800 buyccal - 400mg in eachj check, allow to dissolve for 30 minutes then swallow the rest  - Zofran for N/V  - Toradol for cramps   - Can consider repeat CBC if change in clinical status  - Rh +        Kierra Gaytan, PGY-2    D/W Dr Prajapati

## 2022-02-17 NOTE — ED ADULT NURSE REASSESSMENT NOTE - NS ED NURSE REASSESS COMMENT FT1
Pt medicated. Aware of cytotec use and instructions to swallow remnants of pills in 30 minutes. Wife at bedside. Call bell within reach.

## 2022-02-18 LAB
CULTURE RESULTS: SIGNIFICANT CHANGE UP
SPECIMEN SOURCE: SIGNIFICANT CHANGE UP

## 2022-07-05 ENCOUNTER — NON-APPOINTMENT (OUTPATIENT)
Age: 37
End: 2022-07-05

## 2022-09-08 ENCOUNTER — NON-APPOINTMENT (OUTPATIENT)
Age: 37
End: 2022-09-08

## 2022-10-17 ENCOUNTER — EMERGENCY (EMERGENCY)
Facility: HOSPITAL | Age: 37
LOS: 1 days | Discharge: HOME | End: 2022-10-17
Attending: STUDENT IN AN ORGANIZED HEALTH CARE EDUCATION/TRAINING PROGRAM
Payer: COMMERCIAL

## 2022-10-17 VITALS
HEART RATE: 79 BPM | RESPIRATION RATE: 18 BRPM | HEIGHT: 63 IN | DIASTOLIC BLOOD PRESSURE: 67 MMHG | TEMPERATURE: 98 F | WEIGHT: 210.1 LBS | OXYGEN SATURATION: 99 % | SYSTOLIC BLOOD PRESSURE: 115 MMHG

## 2022-10-17 PROCEDURE — 99285 EMERGENCY DEPT VISIT HI MDM: CPT

## 2022-10-17 NOTE — ED PROVIDER NOTE - OBJECTIVE STATEMENT
itching in bilateral arms and abdomen
37F  PMH anemia at 13 weeks gestation p/w vaginal bleeding w/ clots and abdominal cramping. Pt says that her symptoms started at 19:45 and she has used 1 pad every 2-3 hours. Spoke to Ob Dr. Celina rasmussen was told to come to the ER. Denies dizziness, fever, N/V, chest pain, SOB.

## 2022-10-17 NOTE — ED PROVIDER NOTE - RAPID ASSESSMENT
38 y/o F, , 15 weeks gestation, p/w vaginal bleeding w/ clots and progressively worsening abdominal cramping radiating to back since 745PM tonight. Pt's ob-gyn sent her into ED for further evaluation. Denies any other acute complaints. Pt is nontoxic appearing in triage.     Kary BERG) have documented this rapid assessment note under the dictation of Richard Hicks (PA)  which has been reviewed and affirmed to be accurate. Patient was seen as a QPA patient. The patient will be seen and further worked up in the main emergency department and their care will be completed by the main emergency department team along with a thorough physical exam. Receiving team will follow up on labs, analgesia, any clinical imaging, reassess and disposition as clinically indicated, all decisions regarding the progression of care will be made at their discretion. 36 y/o F, , 15 weeks gestation, p/w vaginal bleeding w/ clots and progressively worsening abdominal cramping radiating to back since 745PM tonight. Pt's ob-gyn sent her into ED for further evaluation. Denies any other acute complaints. Pt is nontoxic appearing in triage.     Kary BERG (Rik) have documented this rapid assessment note under the dictation of Richard Hicks (PA)  which has been reviewed and affirmed to be accurate. Patient was seen as a QPA patient. The patient will be seen and further worked up in the main emergency department and their care will be completed by the main emergency department team along with a thorough physical exam. Receiving team will follow up on labs, analgesia, any clinical imaging, reassess and disposition as clinically indicated, all decisions regarding the progression of care will be made at their discretion.  Richard BERG, personally performed the service described in the documentation recorded by the scribe in my presence, and it accurately and completely records my words and actions.

## 2022-10-17 NOTE — ED PROVIDER NOTE - PATIENT PORTAL LINK FT
You can access the FollowMyHealth Patient Portal offered by [facility] by registering at the following website: [email or web address]. By joining [employer]’s FollowMyHealth portal, you will also be able to view your health information using other applications (apps) compatible with our system.

## 2022-10-17 NOTE — ED PROVIDER NOTE - NSFOLLOWUPINSTRUCTIONS_ED_ALL_ED_FT
You have been evaluated in the Emergency Department today for vaginal bleeding in the setting of pregnancy.    Please schedule an appointment with your Ob/Gyn in 1-2 days to follow-up.    Return to the Emergency Department if you experience continued heavy vaginal bleeding, worsening or uncontrolled pain/cramping, fevers 100.4°F or greater, recurrent vomiting, or any other concerning symptoms.    Thank you for choosing us for your care.

## 2022-10-17 NOTE — ED PROVIDER NOTE - PROGRESS NOTE DETAILS
Jessi Perez M.D. (Resident Physician): Ob consulted and will see pt. Jessi Perez M.D. (Resident Physician): Ob says pt can go home and f/u with her Ob.

## 2022-10-17 NOTE — ED PROVIDER NOTE - PHYSICAL EXAMINATION
PHYSICAL EXAM:  GENERAL: Sitting comfortable in bed, in no acute distress  HENMT: Atraumatic, moist mucous membranes, no oropharyngeal exudates or vesicles, uvula is midline EYES: Clear bilaterally, PERRL, EOMs intact b/l  HEART: RRR, S1/S2, no murmur  RESPIRATORY: Clear to auscultation bilaterally, no wheezes/rhonchi/rales  ABDOMEN: +BS, soft, nontender, nondistended  EXTREMITIES: No lower extremity edema, +2 radial pulses b/l  NEURO: A&Ox4  SKIN:  Skin normal color for race, warm, dry and intact

## 2022-10-17 NOTE — ED PROVIDER NOTE - ATTENDING CONTRIBUTION TO CARE
Attending (Betito Olivares M.D.):  I have personally seen and examined this patient. I have performed a substantive portion of the visit including all aspects of the medical decision making. Resident and/or ACP note reviewed. I agree on the plan of care except where noted.

## 2022-10-17 NOTE — ED ADULT TRIAGE NOTE - CHIEF COMPLAINT QUOTE
13 weeks pregnant, vaginal bleeding 13 weeks pregnant, vaginal bleeding. Spoke with L&D resident ([id number].) Pt to be evaluated in ED.

## 2022-10-17 NOTE — ED PROVIDER NOTE - CLINICAL SUMMARY MEDICAL DECISION MAKING FREE TEXT BOX
37F  PMH anemia at 13 weeks gestation p/w vaginal bleeding w/ clots and abdominal cramping. Vitals unremarkable. On exam, abdomen soft nontender, no LE edema. Concern for  vs benign vaginal bleeding. Plan: blood work, transvaginal US, consult OB. Will re-assess.

## 2022-10-18 VITALS
DIASTOLIC BLOOD PRESSURE: 64 MMHG | SYSTOLIC BLOOD PRESSURE: 106 MMHG | TEMPERATURE: 98 F | OXYGEN SATURATION: 100 % | HEART RATE: 67 BPM | RESPIRATION RATE: 20 BRPM

## 2022-10-18 LAB
ALBUMIN SERPL ELPH-MCNC: 3.6 G/DL — SIGNIFICANT CHANGE UP (ref 3.3–5)
ALP SERPL-CCNC: 42 U/L — SIGNIFICANT CHANGE UP (ref 40–120)
ALT FLD-CCNC: 30 U/L — SIGNIFICANT CHANGE UP (ref 10–45)
ANION GAP SERPL CALC-SCNC: 10 MMOL/L — SIGNIFICANT CHANGE UP (ref 5–17)
AST SERPL-CCNC: 20 U/L — SIGNIFICANT CHANGE UP (ref 10–40)
BASOPHILS # BLD AUTO: 0.03 K/UL — SIGNIFICANT CHANGE UP (ref 0–0.2)
BASOPHILS NFR BLD AUTO: 0.3 % — SIGNIFICANT CHANGE UP (ref 0–2)
BILIRUB SERPL-MCNC: 0.2 MG/DL — SIGNIFICANT CHANGE UP (ref 0.2–1.2)
BUN SERPL-MCNC: 10 MG/DL — SIGNIFICANT CHANGE UP (ref 7–23)
CALCIUM SERPL-MCNC: 9.3 MG/DL — SIGNIFICANT CHANGE UP (ref 8.4–10.5)
CHLORIDE SERPL-SCNC: 105 MMOL/L — SIGNIFICANT CHANGE UP (ref 96–108)
CO2 SERPL-SCNC: 24 MMOL/L — SIGNIFICANT CHANGE UP (ref 22–31)
CREAT SERPL-MCNC: 0.59 MG/DL — SIGNIFICANT CHANGE UP (ref 0.5–1.3)
EGFR: 119 ML/MIN/1.73M2 — SIGNIFICANT CHANGE UP
EOSINOPHIL # BLD AUTO: 0.18 K/UL — SIGNIFICANT CHANGE UP (ref 0–0.5)
EOSINOPHIL NFR BLD AUTO: 1.9 % — SIGNIFICANT CHANGE UP (ref 0–6)
GLUCOSE SERPL-MCNC: 114 MG/DL — HIGH (ref 70–99)
HCG SERPL-ACNC: HIGH MIU/ML
HCT VFR BLD CALC: 36 % — SIGNIFICANT CHANGE UP (ref 34.5–45)
HGB BLD-MCNC: 11.5 G/DL — SIGNIFICANT CHANGE UP (ref 11.5–15.5)
IMM GRANULOCYTES NFR BLD AUTO: 0.4 % — SIGNIFICANT CHANGE UP (ref 0–0.9)
LIDOCAIN IGE QN: 19 U/L — SIGNIFICANT CHANGE UP (ref 7–60)
LYMPHOCYTES # BLD AUTO: 2.17 K/UL — SIGNIFICANT CHANGE UP (ref 1–3.3)
LYMPHOCYTES # BLD AUTO: 23.3 % — SIGNIFICANT CHANGE UP (ref 13–44)
MCHC RBC-ENTMCNC: 27.8 PG — SIGNIFICANT CHANGE UP (ref 27–34)
MCHC RBC-ENTMCNC: 31.9 GM/DL — LOW (ref 32–36)
MCV RBC AUTO: 87.2 FL — SIGNIFICANT CHANGE UP (ref 80–100)
MONOCYTES # BLD AUTO: 0.74 K/UL — SIGNIFICANT CHANGE UP (ref 0–0.9)
MONOCYTES NFR BLD AUTO: 7.9 % — SIGNIFICANT CHANGE UP (ref 2–14)
NEUTROPHILS # BLD AUTO: 6.16 K/UL — SIGNIFICANT CHANGE UP (ref 1.8–7.4)
NEUTROPHILS NFR BLD AUTO: 66.2 % — SIGNIFICANT CHANGE UP (ref 43–77)
NRBC # BLD: 0 /100 WBCS — SIGNIFICANT CHANGE UP (ref 0–0)
PLATELET # BLD AUTO: 177 K/UL — SIGNIFICANT CHANGE UP (ref 150–400)
POTASSIUM SERPL-MCNC: 4.3 MMOL/L — SIGNIFICANT CHANGE UP (ref 3.5–5.3)
POTASSIUM SERPL-SCNC: 4.3 MMOL/L — SIGNIFICANT CHANGE UP (ref 3.5–5.3)
PROT SERPL-MCNC: 6.3 G/DL — SIGNIFICANT CHANGE UP (ref 6–8.3)
RBC # BLD: 4.13 M/UL — SIGNIFICANT CHANGE UP (ref 3.8–5.2)
RBC # FLD: 12.7 % — SIGNIFICANT CHANGE UP (ref 10.3–14.5)
SARS-COV-2 RNA SPEC QL NAA+PROBE: SIGNIFICANT CHANGE UP
SODIUM SERPL-SCNC: 139 MMOL/L — SIGNIFICANT CHANGE UP (ref 135–145)
WBC # BLD: 9.32 K/UL — SIGNIFICANT CHANGE UP (ref 3.8–10.5)
WBC # FLD AUTO: 9.32 K/UL — SIGNIFICANT CHANGE UP (ref 3.8–10.5)

## 2022-10-18 PROCEDURE — 76817 TRANSVAGINAL US OBSTETRIC: CPT | Mod: 26

## 2022-10-18 PROCEDURE — 85025 COMPLETE CBC W/AUTO DIFF WBC: CPT

## 2022-10-18 PROCEDURE — 83690 ASSAY OF LIPASE: CPT

## 2022-10-18 PROCEDURE — 76817 TRANSVAGINAL US OBSTETRIC: CPT

## 2022-10-18 PROCEDURE — 86901 BLOOD TYPING SEROLOGIC RH(D): CPT

## 2022-10-18 PROCEDURE — 86850 RBC ANTIBODY SCREEN: CPT

## 2022-10-18 PROCEDURE — 84702 CHORIONIC GONADOTROPIN TEST: CPT

## 2022-10-18 PROCEDURE — 76801 OB US < 14 WKS SINGLE FETUS: CPT

## 2022-10-18 PROCEDURE — 36415 COLL VENOUS BLD VENIPUNCTURE: CPT

## 2022-10-18 PROCEDURE — 80053 COMPREHEN METABOLIC PANEL: CPT

## 2022-10-18 PROCEDURE — 99284 EMERGENCY DEPT VISIT MOD MDM: CPT | Mod: 25

## 2022-10-18 PROCEDURE — 86900 BLOOD TYPING SEROLOGIC ABO: CPT

## 2022-10-18 PROCEDURE — 87635 SARS-COV-2 COVID-19 AMP PRB: CPT

## 2022-10-18 PROCEDURE — 76801 OB US < 14 WKS SINGLE FETUS: CPT | Mod: 26

## 2022-10-18 NOTE — ED ADULT NURSE NOTE - CINV DISCH TEACH PARTICIP
Impression: Age-related nuclear cataract, bilateral: H25.13. Plan: Discussed signs and symptoms of cataract progression. Pt to RTC PRN in the event of changes to visual status. No recommendation for surgery at present time. Will continue to monitor. Patient/Family

## 2022-10-18 NOTE — CONSULT NOTE ADULT - ATTENDING COMMENTS
Pt seen and examined.  Agree with resident note.  Was present during the entire consultation with review or records, images, history taking, exam and counseling.  Pt with threatened ab, subchorionic hematoma about 1.2 cm seen on sono.  Warning symptoms disc and when to call md disc.    Follow up later this week. Pt has nuchal translucency sonogram this friday.    PLEE

## 2022-10-18 NOTE — CONSULT NOTE ADULT - SUBJECTIVE AND OBJECTIVE BOX
GYN Consult Note    37y  presents with vaginal bleeding and abdominal cramping.   HPI:      OB/GYN HISTORY:   Physician: Adalberto   Ob:   - G1:   - G2:   Gyn: Denies fibroids, cysts, PCOS, endometriosis, or history of genital lesions   PMH: Depression / Anxiety  PSH: Denies   Meds:  Allergies: erythromycin (Unknown), Imitrex (Hives; Rash)      REVIEW OF SYSTEMS  General: denies fevers, chills, tiredness  Skin/Breast: denies breast pain  Respiratory and Thorax: denies shortness of breath, denies cough  Cardiovascular: denies chest pain and denies palpitations  Gastrointestinal: denies abdominal pain, nausea/ vomiting	  Genitourinary: denies dysuria, increased urinary frequency, urgency	  Constitutional, Cardiovascular, Respiratory, Gastrointestinal, Genitourinary, Musculoskeletal and Integumentary review of systems are normal except as noted. 	      Vital Signs Last 24 Hrs  T(C): 36.7 (17 Oct 2022 21:18), Max: 36.7 (17 Oct 2022 21:18)  T(F): 98 (17 Oct 2022 21:18), Max: 98 (17 Oct 2022 21:18)  HR: 79 (17 Oct 2022 21:18) (79 - 79)  BP: 115/67 (17 Oct 2022 21:18) (115/67 - 115/67)  BP(mean): --  RR: 18 (17 Oct 2022 21:18) (18 - 18)  SpO2: 99% (17 Oct 2022 21:18) (99% - 99%)    Parameters below as of 17 Oct 2022 21:18  Patient On (Oxygen Delivery Method): room air        PHYSICAL EXAM:   Gen: NAD, alert and oriented x 3  Cardiovascular: regular   Respiratory: breathing comfortably on RA  Abd: soft, non tender, non-distended  Pelvic: closed/long, no CMT, Uterus: normal size, non tender  Adnexa: non tender, no palpable masses  Extremities: NTBL  Skin: warm and well perfused      LABS:                        11.5   9.32  )-----------( 177      ( 18 Oct 2022 03:03 )             36.0                 RADIOLOGY & ADDITIONAL STUDIES: GYN Consult Note    37y  8w6 by IVF transfer date presents with vaginal bleeding and abdominal cramping.   HPI: Pt recently graduated from [facility] with an IUP. Yesterday evening around 8pm pt noted a sudden gush of bright red blood. Pt has saturated, but not completely soaked 2 pads since this time. Pt has passed 3 golf ball sized clots, most recently in the ED waiting room. Decreasing in flow. Patient reports associated lower abdominal cramping. Not requiring anything for the pain. Tolerating a regular diet with PO hydration without nausea/ emesis.       OB/GYN HISTORY:   Physician: Adalberto   Ob:   - G1: SAB at 6w w/ D&C in    - G2: SAB at 8w w/ D&C in    - IVF pregnancy. Donor egg, donor sperm. Day 5 embryo transfer.   Gyn: Hz of uterine septum s/p removal (, )  PMH: Depression / Anxiety  PSH: As above.   Meds: Lexapro 30mg, Pepcid 20mg, PNV, ASA   Allergies: erythromycin (Unknown), Imitrex (Hives; Rash)      REVIEW OF SYSTEMS  General: denies fevers, chills, tiredness  Skin/Breast: denies breast pain  Respiratory and Thorax: denies shortness of breath, denies cough  Cardiovascular: denies chest pain and denies palpitations  Gastrointestinal: As above.	  Genitourinary: denies dysuria, increased urinary frequency, urgency	  Constitutional, Cardiovascular, Respiratory, Gastrointestinal, Genitourinary, Musculoskeletal and Integumentary review of systems are normal except as noted. 	      Vital Signs Last 24 Hrs  T(C): 36.7 (17 Oct 2022 21:18), Max: 36.7 (17 Oct 2022 21:18)  T(F): 98 (17 Oct 2022 21:18), Max: 98 (17 Oct 2022 21:18)  HR: 79 (17 Oct 2022 21:18) (79 - 79)  BP: 115/67 (17 Oct 2022 21:18) (115/67 - 115/67)  BP(mean): --  RR: 18 (17 Oct 2022 21:18) (18 - 18)  SpO2: 99% (17 Oct 2022 21:18) (99% - 99%)    Parameters below as of 17 Oct 2022 21:18  Patient On (Oxygen Delivery Method): room air      PHYSICAL EXAM:   Gen: NAD, alert and oriented x 3  Cardiovascular: regular   Respiratory: breathing comfortably on RA  Abd: soft, non-distended, mild suprapubic tenderness with direct palpation.   Pelvic: closed, 5 cc of red blood in vault No active bleeding from cervical os.   Extremities: NTBL  Skin: warm and well perfused      LABS:                        11.5   9.32  )-----------( 177      ( 18 Oct 2022 03:03 )             36.0         RADIOLOGY & ADDITIONAL STUDIES:  < from: US Transvaginal, OB (22 @ 22:06) >  FINDINGS:  Uterus: 9.3 x 6.1 x 7.0 cm. Single intrauterine gestational sac   visualized at the beginning of the examination, but absent at the end of   the examination.    Gestational Sac Size (mean): N/A  Gestations Sac Shape: Irregular.  Crown Rump Length: 7 mm.  Estimated Gestational Age: 6 weeks and 4 days by crown rump length.  Yolk Sac: 4 mm.  Fetal Heart Rate: 111 bpm.  (All visualized at the beginning of the examination, but absent at the   end of the examination).    Right ovary: Not visualized.  Left ovary: Not visualized.    Fluid: None.    IMPRESSION:  Single viable intrauterine gestation at the beginning of the examination   with absence of the gestation at the end of the examination compatible   with interval miscarriage.    --- End of Report ---    < end of copied text >

## 2022-10-18 NOTE — ED ADULT NURSE NOTE - OBJECTIVE STATEMENT
Pt presents to the ED A&Ox4 co vaginal bleeding since last night at 1945. Hx of miscarriages x2, status post IVF. Pt states bleeding started spontaneously. Has wet 3 pads saturated w bright red blood and clots since arrival to the ED. Pt presents to the ED A&Ox4 co vaginal bleeding since last night at 1945. Hx of miscarriages x2, status post IVF. Pt states bleeding started spontaneously. Has wet 3 pads saturated w bright red blood and clots since arrival to the ED.Denies NVD, fevers, chills, weakness.

## 2022-10-18 NOTE — CONSULT NOTE ADULT - ASSESSMENT
37y  8w6 by IVF transfer date presents with vaginal bleeding and abdominal cramping. Found to have a live intrauterine gestation with a subchorionic hematoma.     - Pt hemodynamically stable. H/H as above. No active hemorrhage noted.    - TVUS as above. Subchorionic hematoma explained.   - Pt recommended to obtain from work until 1 week after bleeding stops.   - Pt is O+ and does not require Rhogam  - ER return precautions given. Pt to follow with private OB     Pt seen and consoled with Dr. Glover.   Felicia Fung, PGY-2

## 2022-10-21 ENCOUNTER — APPOINTMENT (OUTPATIENT)
Dept: ANTEPARTUM | Facility: CLINIC | Age: 37
End: 2022-10-21

## 2022-10-21 ENCOUNTER — ASOB RESULT (OUTPATIENT)
Age: 37
End: 2022-10-21

## 2022-10-21 PROCEDURE — 76801 OB US < 14 WKS SINGLE FETUS: CPT

## 2022-10-21 PROCEDURE — 76813 OB US NUCHAL MEAS 1 GEST: CPT

## 2022-11-07 ENCOUNTER — APPOINTMENT (OUTPATIENT)
Dept: ANTEPARTUM | Facility: CLINIC | Age: 37
End: 2022-11-07

## 2022-11-07 ENCOUNTER — ASOB RESULT (OUTPATIENT)
Age: 37
End: 2022-11-07

## 2022-11-07 PROCEDURE — 76805 OB US >/= 14 WKS SNGL FETUS: CPT

## 2022-12-14 ENCOUNTER — APPOINTMENT (OUTPATIENT)
Dept: ANTEPARTUM | Facility: CLINIC | Age: 37
End: 2022-12-14

## 2022-12-16 ENCOUNTER — APPOINTMENT (OUTPATIENT)
Dept: ANTEPARTUM | Facility: CLINIC | Age: 37
End: 2022-12-16

## 2022-12-16 ENCOUNTER — ASOB RESULT (OUTPATIENT)
Age: 37
End: 2022-12-16

## 2022-12-16 PROCEDURE — 76817 TRANSVAGINAL US OBSTETRIC: CPT

## 2022-12-16 PROCEDURE — 76811 OB US DETAILED SNGL FETUS: CPT | Mod: 59

## 2022-12-20 ENCOUNTER — APPOINTMENT (OUTPATIENT)
Dept: PEDIATRIC CARDIOLOGY | Facility: CLINIC | Age: 37
End: 2022-12-20

## 2022-12-20 PROCEDURE — 99203 OFFICE O/P NEW LOW 30 MIN: CPT | Mod: 25

## 2022-12-20 PROCEDURE — 76820 UMBILICAL ARTERY ECHO: CPT

## 2022-12-20 PROCEDURE — 93325 DOPPLER ECHO COLOR FLOW MAPG: CPT | Mod: 59

## 2022-12-20 PROCEDURE — 76825 ECHO EXAM OF FETAL HEART: CPT

## 2022-12-20 PROCEDURE — 76827 ECHO EXAM OF FETAL HEART: CPT

## 2023-01-03 ENCOUNTER — NON-APPOINTMENT (OUTPATIENT)
Age: 38
End: 2023-01-03

## 2023-01-11 ENCOUNTER — NON-APPOINTMENT (OUTPATIENT)
Age: 38
End: 2023-01-11

## 2023-01-18 ENCOUNTER — APPOINTMENT (OUTPATIENT)
Dept: ANTEPARTUM | Facility: CLINIC | Age: 38
End: 2023-01-18
Payer: COMMERCIAL

## 2023-01-18 ENCOUNTER — ASOB RESULT (OUTPATIENT)
Age: 38
End: 2023-01-18

## 2023-01-18 PROCEDURE — 76816 OB US FOLLOW-UP PER FETUS: CPT

## 2023-01-20 ENCOUNTER — APPOINTMENT (OUTPATIENT)
Dept: PEDIATRIC CARDIOLOGY | Facility: CLINIC | Age: 38
End: 2023-01-20
Payer: COMMERCIAL

## 2023-01-20 PROCEDURE — 76828 ECHO EXAM OF FETAL HEART: CPT

## 2023-01-20 PROCEDURE — 93325 DOPPLER ECHO COLOR FLOW MAPG: CPT | Mod: 59

## 2023-01-20 PROCEDURE — 76826 ECHO EXAM OF FETAL HEART: CPT

## 2023-01-20 PROCEDURE — 76820 UMBILICAL ARTERY ECHO: CPT

## 2023-01-20 PROCEDURE — 99214 OFFICE O/P EST MOD 30 MIN: CPT | Mod: 25

## 2023-02-23 ENCOUNTER — APPOINTMENT (OUTPATIENT)
Dept: ANTEPARTUM | Facility: CLINIC | Age: 38
End: 2023-02-23
Payer: COMMERCIAL

## 2023-02-23 ENCOUNTER — ASOB RESULT (OUTPATIENT)
Age: 38
End: 2023-02-23

## 2023-02-23 PROCEDURE — 76816 OB US FOLLOW-UP PER FETUS: CPT

## 2023-02-23 PROCEDURE — 76819 FETAL BIOPHYS PROFIL W/O NST: CPT | Mod: 59

## 2023-03-09 ENCOUNTER — APPOINTMENT (OUTPATIENT)
Dept: ANTEPARTUM | Facility: CLINIC | Age: 38
End: 2023-03-09
Payer: COMMERCIAL

## 2023-03-09 ENCOUNTER — ASOB RESULT (OUTPATIENT)
Age: 38
End: 2023-03-09

## 2023-03-09 PROCEDURE — 76819 FETAL BIOPHYS PROFIL W/O NST: CPT

## 2023-03-23 ENCOUNTER — APPOINTMENT (OUTPATIENT)
Dept: ANTEPARTUM | Facility: CLINIC | Age: 38
End: 2023-03-23
Payer: COMMERCIAL

## 2023-03-23 ENCOUNTER — ASOB RESULT (OUTPATIENT)
Age: 38
End: 2023-03-23

## 2023-03-23 PROCEDURE — 76816 OB US FOLLOW-UP PER FETUS: CPT

## 2023-03-23 PROCEDURE — 76819 FETAL BIOPHYS PROFIL W/O NST: CPT

## 2023-03-30 ENCOUNTER — ASOB RESULT (OUTPATIENT)
Age: 38
End: 2023-03-30

## 2023-03-30 ENCOUNTER — APPOINTMENT (OUTPATIENT)
Dept: ANTEPARTUM | Facility: CLINIC | Age: 38
End: 2023-03-30
Payer: COMMERCIAL

## 2023-03-30 PROCEDURE — 76819 FETAL BIOPHYS PROFIL W/O NST: CPT

## 2023-04-03 ENCOUNTER — NON-APPOINTMENT (OUTPATIENT)
Age: 38
End: 2023-04-03

## 2023-04-05 ENCOUNTER — OUTPATIENT (OUTPATIENT)
Dept: OUTPATIENT SERVICES | Facility: HOSPITAL | Age: 38
LOS: 1 days | End: 2023-04-05
Payer: COMMERCIAL

## 2023-04-05 VITALS
WEIGHT: 231.93 LBS | HEART RATE: 90 BPM | OXYGEN SATURATION: 96 % | TEMPERATURE: 99 F | SYSTOLIC BLOOD PRESSURE: 124 MMHG | RESPIRATION RATE: 20 BRPM | HEIGHT: 63.5 IN | DIASTOLIC BLOOD PRESSURE: 84 MMHG

## 2023-04-05 DIAGNOSIS — Z98.890 OTHER SPECIFIED POSTPROCEDURAL STATES: Chronic | ICD-10-CM

## 2023-04-05 DIAGNOSIS — Z98.891 HISTORY OF UTERINE SCAR FROM PREVIOUS SURGERY: Chronic | ICD-10-CM

## 2023-04-05 DIAGNOSIS — Z34.90 ENCOUNTER FOR SUPERVISION OF NORMAL PREGNANCY, UNSPECIFIED, UNSPECIFIED TRIMESTER: ICD-10-CM

## 2023-04-05 DIAGNOSIS — Z29.9 ENCOUNTER FOR PROPHYLACTIC MEASURES, UNSPECIFIED: ICD-10-CM

## 2023-04-05 DIAGNOSIS — K08.409 PARTIAL LOSS OF TEETH, UNSPECIFIED CAUSE, UNSPECIFIED CLASS: Chronic | ICD-10-CM

## 2023-04-05 LAB
ANION GAP SERPL CALC-SCNC: 11 MMOL/L — SIGNIFICANT CHANGE UP (ref 5–17)
BLD GP AB SCN SERPL QL: NEGATIVE — SIGNIFICANT CHANGE UP
BUN SERPL-MCNC: 7 MG/DL — SIGNIFICANT CHANGE UP (ref 7–23)
CALCIUM SERPL-MCNC: 9.3 MG/DL — SIGNIFICANT CHANGE UP (ref 8.4–10.5)
CHLORIDE SERPL-SCNC: 102 MMOL/L — SIGNIFICANT CHANGE UP (ref 96–108)
CO2 SERPL-SCNC: 22 MMOL/L — SIGNIFICANT CHANGE UP (ref 22–31)
CREAT SERPL-MCNC: 0.56 MG/DL — SIGNIFICANT CHANGE UP (ref 0.5–1.3)
EGFR: 120 ML/MIN/1.73M2 — SIGNIFICANT CHANGE UP
GLUCOSE SERPL-MCNC: 141 MG/DL — HIGH (ref 70–99)
HCT VFR BLD CALC: 38 % — SIGNIFICANT CHANGE UP (ref 34.5–45)
HGB BLD-MCNC: 12.5 G/DL — SIGNIFICANT CHANGE UP (ref 11.5–15.5)
MCHC RBC-ENTMCNC: 28.7 PG — SIGNIFICANT CHANGE UP (ref 27–34)
MCHC RBC-ENTMCNC: 32.9 GM/DL — SIGNIFICANT CHANGE UP (ref 32–36)
MCV RBC AUTO: 87.4 FL — SIGNIFICANT CHANGE UP (ref 80–100)
NRBC # BLD: 0 /100 WBCS — SIGNIFICANT CHANGE UP (ref 0–0)
PLATELET # BLD AUTO: 168 K/UL — SIGNIFICANT CHANGE UP (ref 150–400)
POTASSIUM SERPL-MCNC: 4.1 MMOL/L — SIGNIFICANT CHANGE UP (ref 3.5–5.3)
POTASSIUM SERPL-SCNC: 4.1 MMOL/L — SIGNIFICANT CHANGE UP (ref 3.5–5.3)
RBC # BLD: 4.35 M/UL — SIGNIFICANT CHANGE UP (ref 3.8–5.2)
RBC # FLD: 17.3 % — HIGH (ref 10.3–14.5)
RH IG SCN BLD-IMP: POSITIVE — SIGNIFICANT CHANGE UP
SODIUM SERPL-SCNC: 135 MMOL/L — SIGNIFICANT CHANGE UP (ref 135–145)
WBC # BLD: 10.38 K/UL — SIGNIFICANT CHANGE UP (ref 3.8–10.5)
WBC # FLD AUTO: 10.38 K/UL — SIGNIFICANT CHANGE UP (ref 3.8–10.5)

## 2023-04-05 PROCEDURE — 86901 BLOOD TYPING SEROLOGIC RH(D): CPT

## 2023-04-05 PROCEDURE — 86900 BLOOD TYPING SEROLOGIC ABO: CPT

## 2023-04-05 PROCEDURE — 86850 RBC ANTIBODY SCREEN: CPT

## 2023-04-05 PROCEDURE — 80048 BASIC METABOLIC PNL TOTAL CA: CPT

## 2023-04-05 PROCEDURE — G0463: CPT

## 2023-04-05 PROCEDURE — 85027 COMPLETE CBC AUTOMATED: CPT

## 2023-04-05 RX ORDER — ESCITALOPRAM OXALATE 10 MG/1
1 TABLET, FILM COATED ORAL
Refills: 0 | DISCHARGE

## 2023-04-05 RX ORDER — CHOLECALCIFEROL (VITAMIN D3) 125 MCG
0 CAPSULE ORAL
Refills: 0 | DISCHARGE

## 2023-04-05 NOTE — OB PST NOTE - NSICDXFAMILYHX_GEN_ALL_CORE_FT
FAMILY HISTORY:  Father  Still living? Yes, Estimated age: Age Unknown  FHx: heart disease, Age at diagnosis: Age Unknown    Mother  Still living? Yes, Estimated age: Age Unknown  Family hx of hypertension, Age at diagnosis: Age Unknown

## 2023-04-05 NOTE — OB PST NOTE - NSICDXPASTSURGICALHX_GEN_ALL_CORE_FT
PAST SURGICAL HISTORY:  No significant past surgical history      PAST SURGICAL HISTORY:  H/O dilation and curettage     Status post hysteroscopic resection of uterine septum      PAST SURGICAL HISTORY:  H/O dilation and curettage     S/P colonoscopy with polypectomy     S/P tooth extraction     Status post hysteroscopic resection of uterine septum

## 2023-04-05 NOTE — OB PST NOTE - NSICDXPASTMEDICALHX_GEN_ALL_CORE_FT
PAST MEDICAL HISTORY:  Anxiety     Currently pregnant     Depression      PAST MEDICAL HISTORY:  2019 novel coronavirus disease (COVID-19)     Anemia     Anxiety     Currently pregnant     Depression     H/O heartburn     H/O Helicobacter infection     H/O hemorrhoids     Uterine septum

## 2023-04-05 NOTE — OB PST NOTE - NSANTHOSAYNRD_GEN_A_CORE
No. WOLF screening performed.  STOP BANG Legend: 0-2 = LOW Risk; 3-4 = INTERMEDIATE Risk; 5-8 = HIGH Risk

## 2023-04-05 NOTE — OB PST NOTE - NS_OBGYNHISTORY_OBGYN_ALL_OB_FT
colonoscopy polypectomy   hemorrhoid   endoscopy [facility] treated 6 years   pepcid   wisdom extracted   HTN for parents, afib dad   D&C for miscarriages x2  septum uterine surgery   post miscarriage depression      colonoscopy polypectomy   hemorrhoid   endoscopy [facility] treated 6 years   pepcid   mervat extracted   HTN for parents, afib dad   D&C for miscarriages x2  septum uterine surgery   post miscarriage depression

## 2023-04-05 NOTE — OB PST NOTE - ASSESSMENT
CAPRINI SCORE [CLOT updated 18]    AGE RELATED RISK FACTORS                                                       MOBILITY RELATED FACTORS  [ ] Age 41-60 years                                            (1 Point)                    [ ] Bed rest                                                        (1 Point)  [ ] Age: 61-74 years                                           (2 Points)                  [ ] Plaster cast                                                   (2 Points)  [ ] Age= 75 years                                              (3 Points)                    [ ] Bed bound for more than 72 hours                 (2 Points)    DISEASE RELATED RISK FACTORS                                               GENDER SPECIFIC FACTORS  [ ] Edema in the lower extremities                       (1 Point)              [1 ] Pregnancy                                                     (1 Point)  [ ] Varicose veins                                               (1 Point)                     [ ] Post-partum < 6 weeks                                   (1 Point)             [1 ] BMI > 25 Kg/m2                                            (1 Point)                     [ ] Hormonal therapy  or oral contraception          (1 Point)                 [ ] Sepsis (in the previous month)                        (1 Point)               [ ] History of pregnancy complications                 (1 point)  [ ] Pneumonia or serious lung disease                                               [ ] Unexplained or recurrent                     (1 Point)           (in the previous month)                               (1 Point)  [ ] Abnormal pulmonary function test                     (1 Point)                 SURGERY RELATED RISK FACTORS  [ ] Acute myocardial infarction                              (1 Point)               [1 ]  Section                                             (1 Point)  [ ] Congestive heart failure (in the previous month)  (1 Point)      [ ] Minor surgery                                                  (1 Point)   [ ] Inflammatory bowel disease                             (1 Point)               [ ] Arthroscopic surgery                                        (2 Points)  [ ] Central venous access                                      (2 Points)                [ 2] General surgery lasting more than 45 minutes (2 points)  [ ] Present or previous malignancy                     (2 Points)                [ ] Elective arthroplasty                                         (5 points)    [ ] Stroke (in the previous month)                          (5 Points)                                                                                                                                                           HEMATOLOGY RELATED FACTORS                                                 TRAUMA RELATED RISK FACTORS  [ ] Prior episodes of VTE                                     (3 Points)                [ ] Fracture of the hip, pelvis, or leg                       (5 Points)  [ ] Positive family history for VTE                         (3 Points)             [ ] Acute spinal cord injury (in the previous month)  (5 Points)  [ ] Prothrombin 93349 A                                     (3 Points)               [ ] Paralysis  (less than 1 month)                             (5 Points)  [ ] Factor V Leiden                                             (3 Points)                  [ ] Multiple Trauma within 1 month                        (5 Points)  [ ] Lupus anticoagulants                                     (3 Points)                                                           [ ] Anticardiolipin antibodies                               (3 Points)                                                       [ ] High homocysteine in the blood                      (3 Points)                                             [ ] Other congenital or acquired thrombophilia      (3 Points)                                                [ ] Heparin induced thrombocytopenia                  (3 Points)                                     Total Score [5]

## 2023-04-05 NOTE — OB PST NOTE - HISTORY OF PRESENT ILLNESS
37w 3 da 37 year old , presents for preop testing for scheduled primary  for Breech presentation. She is 37w3d gestation, reports feeling baby movement, no contractions, occasional lower back pain, no vaginal spotting. Her medical history significant for anxiety/depression on medication, ?L4-5 slipped discs, iron deficiency anemia (received iron infusion couple weeks ago), migraine headaches, 2 miscarriages in the past, heartburn, hemorrhoids, treated for H-pilori 6 years ago, covid-19 infection 2020.     Patient is scheduled for covid-19 PCR test on 2023 at [facility]

## 2023-04-05 NOTE — OB PST NOTE - PROBLEM SELECTOR PLAN 1
Scheduled for primary  for breech presentation   preop instruction provided verbally and in written, pt verbalized understanding and teach back

## 2023-04-06 ENCOUNTER — ASOB RESULT (OUTPATIENT)
Age: 38
End: 2023-04-06

## 2023-04-06 ENCOUNTER — APPOINTMENT (OUTPATIENT)
Dept: ANTEPARTUM | Facility: CLINIC | Age: 38
End: 2023-04-06
Payer: COMMERCIAL

## 2023-04-06 PROCEDURE — 76819 FETAL BIOPHYS PROFIL W/O NST: CPT

## 2023-04-09 ENCOUNTER — NON-APPOINTMENT (OUTPATIENT)
Age: 38
End: 2023-04-09

## 2023-04-13 ENCOUNTER — APPOINTMENT (OUTPATIENT)
Dept: ANTEPARTUM | Facility: CLINIC | Age: 38
End: 2023-04-13
Payer: COMMERCIAL

## 2023-04-13 ENCOUNTER — ASOB RESULT (OUTPATIENT)
Age: 38
End: 2023-04-13

## 2023-04-13 PROCEDURE — 76819 FETAL BIOPHYS PROFIL W/O NST: CPT

## 2023-04-14 ENCOUNTER — OUTPATIENT (OUTPATIENT)
Dept: OUTPATIENT SERVICES | Facility: HOSPITAL | Age: 38
LOS: 1 days | End: 2023-04-14
Payer: COMMERCIAL

## 2023-04-14 DIAGNOSIS — Z98.890 OTHER SPECIFIED POSTPROCEDURAL STATES: Chronic | ICD-10-CM

## 2023-04-14 DIAGNOSIS — Z98.891 HISTORY OF UTERINE SCAR FROM PREVIOUS SURGERY: Chronic | ICD-10-CM

## 2023-04-14 DIAGNOSIS — Z11.52 ENCOUNTER FOR SCREENING FOR COVID-19: ICD-10-CM

## 2023-04-14 DIAGNOSIS — K08.409 PARTIAL LOSS OF TEETH, UNSPECIFIED CAUSE, UNSPECIFIED CLASS: Chronic | ICD-10-CM

## 2023-04-14 LAB — SARS-COV-2 RNA SPEC QL NAA+PROBE: SIGNIFICANT CHANGE UP

## 2023-04-14 PROCEDURE — U0003: CPT

## 2023-04-14 PROCEDURE — U0005: CPT

## 2023-04-14 PROCEDURE — C9803: CPT

## 2023-04-15 ENCOUNTER — INPATIENT (INPATIENT)
Facility: HOSPITAL | Age: 38
LOS: 4 days | Discharge: HOME | End: 2023-04-20
Attending: OBSTETRICS & GYNECOLOGY | Admitting: OBSTETRICS & GYNECOLOGY
Payer: COMMERCIAL

## 2023-04-15 ENCOUNTER — TRANSCRIPTION ENCOUNTER (OUTPATIENT)
Age: 38
End: 2023-04-15

## 2023-04-15 ENCOUNTER — RESULT REVIEW (OUTPATIENT)
Age: 38
End: 2023-04-15

## 2023-04-15 VITALS
TEMPERATURE: 98 F | DIASTOLIC BLOOD PRESSURE: 74 MMHG | HEART RATE: 71 BPM | OXYGEN SATURATION: 98 % | SYSTOLIC BLOOD PRESSURE: 158 MMHG | RESPIRATION RATE: 16 BRPM

## 2023-04-15 DIAGNOSIS — Z34.80 ENCOUNTER FOR SUPERVISION OF OTHER NORMAL PREGNANCY, UNSPECIFIED TRIMESTER: ICD-10-CM

## 2023-04-15 DIAGNOSIS — O26.899 OTHER SPECIFIED PREGNANCY RELATED CONDITIONS, UNSPECIFIED TRIMESTER: ICD-10-CM

## 2023-04-15 DIAGNOSIS — Z98.890 OTHER SPECIFIED POSTPROCEDURAL STATES: Chronic | ICD-10-CM

## 2023-04-15 DIAGNOSIS — Z98.891 HISTORY OF UTERINE SCAR FROM PREVIOUS SURGERY: Chronic | ICD-10-CM

## 2023-04-15 DIAGNOSIS — K08.409 PARTIAL LOSS OF TEETH, UNSPECIFIED CAUSE, UNSPECIFIED CLASS: Chronic | ICD-10-CM

## 2023-04-15 PROBLEM — Z87.19 PERSONAL HISTORY OF OTHER DISEASES OF THE DIGESTIVE SYSTEM: Chronic | Status: ACTIVE | Noted: 2023-04-05

## 2023-04-15 PROBLEM — Q51.28 OTHER AND UNSPECIFIED DOUBLING OF UTERUS: Chronic | Status: ACTIVE | Noted: 2023-04-05

## 2023-04-15 PROBLEM — Z87.898 PERSONAL HISTORY OF OTHER SPECIFIED CONDITIONS: Chronic | Status: ACTIVE | Noted: 2023-04-05

## 2023-04-15 PROBLEM — Z86.19 PERSONAL HISTORY OF OTHER INFECTIOUS AND PARASITIC DISEASES: Chronic | Status: ACTIVE | Noted: 2023-04-05

## 2023-04-15 PROBLEM — Z34.90 ENCOUNTER FOR SUPERVISION OF NORMAL PREGNANCY, UNSPECIFIED, UNSPECIFIED TRIMESTER: Chronic | Status: ACTIVE | Noted: 2023-04-05

## 2023-04-15 PROBLEM — D64.9 ANEMIA, UNSPECIFIED: Chronic | Status: ACTIVE | Noted: 2023-04-05

## 2023-04-15 PROBLEM — U07.1 COVID-19: Chronic | Status: ACTIVE | Noted: 2023-04-05

## 2023-04-15 LAB
ALBUMIN SERPL ELPH-MCNC: 3.5 G/DL — SIGNIFICANT CHANGE UP (ref 3.3–5)
ALP SERPL-CCNC: 124 U/L — HIGH (ref 40–120)
ALT FLD-CCNC: 83 U/L — HIGH (ref 10–45)
AMYLASE P1 CFR SERPL: 75 U/L — SIGNIFICANT CHANGE UP (ref 25–125)
ANION GAP SERPL CALC-SCNC: 9 MMOL/L — SIGNIFICANT CHANGE UP (ref 5–17)
APPEARANCE UR: CLEAR — SIGNIFICANT CHANGE UP
APTT BLD: 28.7 SEC — SIGNIFICANT CHANGE UP (ref 27.5–35.5)
AST SERPL-CCNC: 29 U/L — SIGNIFICANT CHANGE UP (ref 10–40)
BACTERIA # UR AUTO: NEGATIVE — SIGNIFICANT CHANGE UP
BASOPHILS # BLD AUTO: 0.03 K/UL — SIGNIFICANT CHANGE UP (ref 0–0.2)
BASOPHILS NFR BLD AUTO: 0.3 % — SIGNIFICANT CHANGE UP (ref 0–2)
BILIRUB SERPL-MCNC: 0.2 MG/DL — SIGNIFICANT CHANGE UP (ref 0.2–1.2)
BILIRUB UR-MCNC: NEGATIVE — SIGNIFICANT CHANGE UP
BLD GP AB SCN SERPL QL: NEGATIVE — SIGNIFICANT CHANGE UP
BUN SERPL-MCNC: 6 MG/DL — LOW (ref 7–23)
CALCIUM SERPL-MCNC: 9.3 MG/DL — SIGNIFICANT CHANGE UP (ref 8.4–10.5)
CHLORIDE SERPL-SCNC: 105 MMOL/L — SIGNIFICANT CHANGE UP (ref 96–108)
CO2 SERPL-SCNC: 24 MMOL/L — SIGNIFICANT CHANGE UP (ref 22–31)
COLOR SPEC: YELLOW — SIGNIFICANT CHANGE UP
CREAT ?TM UR-MCNC: 114 MG/DL — SIGNIFICANT CHANGE UP
CREAT SERPL-MCNC: 0.64 MG/DL — SIGNIFICANT CHANGE UP (ref 0.5–1.3)
DIFF PNL FLD: ABNORMAL
EGFR: 117 ML/MIN/1.73M2 — SIGNIFICANT CHANGE UP
EOSINOPHIL # BLD AUTO: 0.09 K/UL — SIGNIFICANT CHANGE UP (ref 0–0.5)
EOSINOPHIL NFR BLD AUTO: 1 % — SIGNIFICANT CHANGE UP (ref 0–6)
EPI CELLS # UR: 2 /HPF — SIGNIFICANT CHANGE UP
FIBRINOGEN PPP-MCNC: 766 MG/DL — HIGH (ref 200–445)
GLUCOSE SERPL-MCNC: 87 MG/DL — SIGNIFICANT CHANGE UP (ref 70–99)
GLUCOSE UR QL: NEGATIVE — SIGNIFICANT CHANGE UP
HCT VFR BLD CALC: 39.2 % — SIGNIFICANT CHANGE UP (ref 34.5–45)
HGB BLD-MCNC: 12.7 G/DL — SIGNIFICANT CHANGE UP (ref 11.5–15.5)
HYALINE CASTS # UR AUTO: 1 /LPF — SIGNIFICANT CHANGE UP (ref 0–2)
IMM GRANULOCYTES NFR BLD AUTO: 0.4 % — SIGNIFICANT CHANGE UP (ref 0–0.9)
INR BLD: 0.91 RATIO — SIGNIFICANT CHANGE UP (ref 0.88–1.16)
KETONES UR-MCNC: NEGATIVE — SIGNIFICANT CHANGE UP
LDH SERPL L TO P-CCNC: 142 U/L — SIGNIFICANT CHANGE UP (ref 50–242)
LEUKOCYTE ESTERASE UR-ACNC: NEGATIVE — SIGNIFICANT CHANGE UP
LIDOCAIN IGE QN: 23 U/L — SIGNIFICANT CHANGE UP (ref 7–60)
LYMPHOCYTES # BLD AUTO: 1.61 K/UL — SIGNIFICANT CHANGE UP (ref 1–3.3)
LYMPHOCYTES # BLD AUTO: 17.3 % — SIGNIFICANT CHANGE UP (ref 13–44)
MCHC RBC-ENTMCNC: 28.1 PG — SIGNIFICANT CHANGE UP (ref 27–34)
MCHC RBC-ENTMCNC: 32.4 GM/DL — SIGNIFICANT CHANGE UP (ref 32–36)
MCV RBC AUTO: 86.7 FL — SIGNIFICANT CHANGE UP (ref 80–100)
MONOCYTES # BLD AUTO: 0.6 K/UL — SIGNIFICANT CHANGE UP (ref 0–0.9)
MONOCYTES NFR BLD AUTO: 6.5 % — SIGNIFICANT CHANGE UP (ref 2–14)
NEUTROPHILS # BLD AUTO: 6.93 K/UL — SIGNIFICANT CHANGE UP (ref 1.8–7.4)
NEUTROPHILS NFR BLD AUTO: 74.5 % — SIGNIFICANT CHANGE UP (ref 43–77)
NITRITE UR-MCNC: NEGATIVE — SIGNIFICANT CHANGE UP
NRBC # BLD: 0 /100 WBCS — SIGNIFICANT CHANGE UP (ref 0–0)
PH UR: 6.5 — SIGNIFICANT CHANGE UP (ref 5–8)
PLATELET # BLD AUTO: 223 K/UL — SIGNIFICANT CHANGE UP (ref 150–400)
POTASSIUM SERPL-MCNC: 4.7 MMOL/L — SIGNIFICANT CHANGE UP (ref 3.5–5.3)
POTASSIUM SERPL-SCNC: 4.7 MMOL/L — SIGNIFICANT CHANGE UP (ref 3.5–5.3)
PROT ?TM UR-MCNC: 14 MG/DL — HIGH (ref 0–12)
PROT SERPL-MCNC: 6.6 G/DL — SIGNIFICANT CHANGE UP (ref 6–8.3)
PROT UR-MCNC: ABNORMAL
PROT/CREAT UR-RTO: 0.1 RATIO — SIGNIFICANT CHANGE UP (ref 0–0.2)
PROTHROM AB SERPL-ACNC: 10.5 SEC — SIGNIFICANT CHANGE UP (ref 10.5–13.4)
RBC # BLD: 4.52 M/UL — SIGNIFICANT CHANGE UP (ref 3.8–5.2)
RBC # FLD: 17.2 % — HIGH (ref 10.3–14.5)
RBC CASTS # UR COMP ASSIST: 6 /HPF — HIGH (ref 0–4)
RH IG SCN BLD-IMP: POSITIVE — SIGNIFICANT CHANGE UP
SODIUM SERPL-SCNC: 138 MMOL/L — SIGNIFICANT CHANGE UP (ref 135–145)
SP GR SPEC: 1.02 — SIGNIFICANT CHANGE UP (ref 1.01–1.02)
URATE SERPL-MCNC: 4.3 MG/DL — SIGNIFICANT CHANGE UP (ref 2.5–7)
UROBILINOGEN FLD QL: NEGATIVE — SIGNIFICANT CHANGE UP
WBC # BLD: 9.3 K/UL — SIGNIFICANT CHANGE UP (ref 3.8–10.5)
WBC # FLD AUTO: 9.3 K/UL — SIGNIFICANT CHANGE UP (ref 3.8–10.5)
WBC UR QL: 2 /HPF — SIGNIFICANT CHANGE UP (ref 0–5)

## 2023-04-15 PROCEDURE — 76700 US EXAM ABDOM COMPLETE: CPT | Mod: 26

## 2023-04-15 RX ORDER — SIMETHICONE 80 MG/1
80 TABLET, CHEWABLE ORAL ONCE
Refills: 0 | Status: COMPLETED | OUTPATIENT
Start: 2023-04-15 | End: 2023-04-15

## 2023-04-15 RX ORDER — ACETAMINOPHEN 500 MG
1000 TABLET ORAL ONCE
Refills: 0 | Status: COMPLETED | OUTPATIENT
Start: 2023-04-15 | End: 2023-04-15

## 2023-04-15 RX ORDER — AMOXICILLIN 250 MG/5ML
875 SUSPENSION, RECONSTITUTED, ORAL (ML) ORAL
Refills: 0 | Status: DISCONTINUED | OUTPATIENT
Start: 2023-04-15 | End: 2023-04-17

## 2023-04-15 RX ORDER — SODIUM CHLORIDE 9 MG/ML
1000 INJECTION, SOLUTION INTRAVENOUS ONCE
Refills: 0 | Status: COMPLETED | OUTPATIENT
Start: 2023-04-15 | End: 2023-04-15

## 2023-04-15 RX ORDER — ONDANSETRON 8 MG/1
4 TABLET, FILM COATED ORAL ONCE
Refills: 0 | Status: COMPLETED | OUTPATIENT
Start: 2023-04-15 | End: 2023-04-15

## 2023-04-15 RX ORDER — SODIUM CHLORIDE 9 MG/ML
1000 INJECTION, SOLUTION INTRAVENOUS
Refills: 0 | Status: DISCONTINUED | OUTPATIENT
Start: 2023-04-15 | End: 2023-04-18

## 2023-04-15 RX ADMIN — Medication 875 MILLIGRAM(S): at 23:07

## 2023-04-15 RX ADMIN — SODIUM CHLORIDE 1000 MILLILITER(S): 9 INJECTION, SOLUTION INTRAVENOUS at 18:07

## 2023-04-15 RX ADMIN — Medication 400 MILLIGRAM(S): at 21:10

## 2023-04-15 RX ADMIN — ONDANSETRON 4 MILLIGRAM(S): 8 TABLET, FILM COATED ORAL at 14:57

## 2023-04-15 RX ADMIN — SIMETHICONE 80 MILLIGRAM(S): 80 TABLET, CHEWABLE ORAL at 18:03

## 2023-04-15 RX ADMIN — SODIUM CHLORIDE 125 MILLILITER(S): 9 INJECTION, SOLUTION INTRAVENOUS at 19:25

## 2023-04-15 RX ADMIN — Medication 1000 MILLIGRAM(S): at 22:00

## 2023-04-15 NOTE — OB PROVIDER TRIAGE NOTE - NSHPPHYSICALEXAM_GEN_ALL_CORE
Objective    Vital Signs Last 24 Hrs  T(C): 36.5 (15 Apr 2023 11:15), Max: 36.5 (15 Apr 2023 11:10)  T(F): 97.7 (15 Apr 2023 11:15), Max: 97.7 (15 Apr 2023 11:10)  HR: 59 (15 Apr 2023 12:48) (59 - 97)  BP: 108/55 (15 Apr 2023 12:48) (108/55 - 158/74)  RR: 18 (15 Apr 2023 11:15) (16 - 18)  SpO2: 94% (15 Apr 2023 12:47) (94% - 100%)    Parameters below as of 15 Apr 2023 11:10  Patient On (Oxygen Delivery Method): room air    – Physical exam  CV: extremities well perfused  Pulm: normal respiratory effort on room air  Abd: gravid, nontender  Extr: moving all extremities with ease    – VE: 0/0/-3    – FHT: baseline 130, mod variability, +accels, -decels  – [place]: acontractile Objective    Vital Signs Last 24 Hrs  T(C): 36.5 (15 Apr 2023 11:15), Max: 36.5 (15 Apr 2023 11:10)  T(F): 97.7 (15 Apr 2023 11:15), Max: 97.7 (15 Apr 2023 11:10)  HR: 59 (15 Apr 2023 12:48) (59 - 97)  BP: 108/55 (15 Apr 2023 12:48) (108/55 - 158/74)  RR: 18 (15 Apr 2023 11:15) (16 - 18)  SpO2: 94% (15 Apr 2023 12:47) (94% - 100%)    Parameters below as of 15 Apr 2023 11:10  Patient On (Oxygen Delivery Method): room air    – Physical exam  CV: extremities well perfused  Pulm: normal respiratory effort on room air  Abd: gravid, nondistended, significant tenderness to RUQ beneath rib cage most prominent laterally; no epigastric tenderness  Back: no flank tenderness b/l  Extr: moving all extremities with ease    – VE: 0/0/-3    – FHT: baseline 130, mod variability, +accels, -decels  – [place]: acontractile Objective    Vital Signs Last 24 Hrs  T(C): 36.5 (15 Apr 2023 11:15), Max: 36.5 (15 Apr 2023 11:10)  T(F): 97.7 (15 Apr 2023 11:15), Max: 97.7 (15 Apr 2023 11:10)  HR: 59 (15 Apr 2023 12:48) (59 - 97)  BP: 108/55 (15 Apr 2023 12:48) (108/55 - 158/74)  RR: 18 (15 Apr 2023 11:15) (16 - 18)  SpO2: 94% (15 Apr 2023 12:47) (94% - 100%)    Parameters below as of 15 Apr 2023 11:10  Patient On (Oxygen Delivery Method): room air    – Physical exam  CV: extremities well perfused  Pulm: normal respiratory effort on room air  Abd: gravid, nondistended, significant tenderness to RUQ beneath rib cage most prominent laterally; no epigastric tenderness  Back: no CVA tenderness b/l  Extr: moving all extremities with ease    – VE: 0/0/-3    – FHT: baseline 130, mod variability, +accels, -decels  – [place]: acontractile

## 2023-04-15 NOTE — OB PROVIDER TRIAGE NOTE - NSOBPROVIDERNOTE_OBGYN_ALL_OB_FT
Patient is a 36y/o  at 38w6d presenting with 4 day h/o severe RUQ pain. Initial BP mild range, likely due to pain and distress. Following BPs wnl. Denies other preeclamptic sxs. Differential includes musculoskeletal pain vs gallbladder/pancreas pathology.    PLAN:  - HELLP labs, Amylase, Lipase  - RUQ sonogram to evaluate for gallstones, cholecystitis, pancreatitis    Discussed with attending, Dr. Aki Delacruz PGY1 Patient is a 36y/o  at 38w6d presenting with 4 day h/o severe RUQ pain. Initial BP mild range, likely due to pain and distress. Following BPs wnl. Denies other preeclamptic sxs. Differential includes musculoskeletal pain vs gallbladder/pancreas pathology.    PLAN:  - HELLP labs, Amylase, Lipase  - RUQ sonogram to evaluate for gallstones, cholecystitis, pancreatitis    Discussed with attending, Dr. Aki Delacruz PGY1    *6pm update*    - Labs significant for AST/ALT   - Upon review of past labs, patient with normal LFTs in , denies known LFT elevations, denies hypertensive disorders  - Pt recently returned from Abdominal ultrasound, report pending - call placed to Radiology Reading room requesting an expedited read  - 1L LR bolus now  - Patient disposition pending Abdominal sonogram read    Discussed with attending, Dr. Aki Delacruz PGY1 Patient is a 36y/o  at 38w6d presenting with 4 day h/o severe RUQ pain. Initial BP mild range, likely due to pain and distress. Following BPs wnl. Denies other preeclamptic sxs. Differential includes musculoskeletal pain vs gallbladder/pancreas pathology.    PLAN:  - HELLP labs, Amylase, Lipase  - RUQ sonogram to evaluate for gallstones, cholecystitis, pancreatitis    Discussed with attending, Dr. Aki Delacruz PGY1    *6pm update*    - Labs significant for AST/ALT   - Upon review of past labs, patient with normal LFTs in , denies known LFT elevations, denies hypertensive disorders  - Pt recently returned from Abdominal ultrasound, report pending - call placed to Radiology Reading room requesting an expedited read  - 1L LR bolus now  - Patient disposition pending Abdominal sonogram read    Discussed with attending, Dr. Aki Delacruz PGY1    Addendum 4/15/23 @730p  RUQ US resulted     < from: US Abdomen Complete (US Abdomen Complete .) (04.15.23 @ 17:44) >    US ABDOMEN COMPLETE   ORDERED BY:  OCTAVIANO CARBONE     PROCEDURE DATE:  04/15/2023          INTERPRETATION:  CLINICAL INFORMATION: Right upper quadrant pain, 38   weeks pregnant.    COMPARISON: None available.    TECHNIQUE: Sonography of the abdomen.    FINDINGS:  Liver: Within normal limits.  Bile ducts: Normal caliber. Common bile duct measures 6 mm.  Gallbladder: Small gallstones.. Positive sonographic Damian's sign. No   significant gallbladder wall thickening or pericholecystic fluid.  Pancreas: Poorly visualized.  Spleen: 11 cm. Within normal limits.  Right kidney: 10.8 cm. No hydronephrosis.  Left kidney: 11.5 cm. No hydronephrosis.  Ascites: None.  Aorta and IVC: Visualized portions are within normal limits.    IMPRESSION:  Cholelithiasis. Positive sonographic Damian sign. No gallbladder wall   thickening or pericholecystic fluid. Findings are equivocal for acute   cholecystitis.      --- End of Report ---        < end of copied text >    Patient still having significant pain.   Plan for gen surg consult for possible acute cholecystitis  Patient scanned and is vertex. Patient would still desire a primary  section electively.     Peggy Mercado, PGY2  d/w Dr. Prabhakar Patient is a 38y/o  at 38w6d presenting with 4 day h/o severe RUQ pain. Initial BP mild range, likely due to pain and distress. Following BPs wnl. Denies other preeclamptic sxs. Differential includes musculoskeletal pain vs gallbladder/pancreas pathology.    PLAN:  - HELLP labs, Amylase, Lipase  - RUQ sonogram to evaluate for gallstones, cholecystitis, pancreatitis    Discussed with attending, Dr. Aki Delacruz PGY1      *6pm update*    - Labs significant for AST/ALT   - Upon review of past labs, patient with normal LFTs in , denies known LFT elevations, denies hypertensive disorders  - Pt recently returned from Abdominal ultrasound, report pending - call placed to Radiology Reading room requesting an expedited read  - 1L LR bolus now  - Patient disposition pending Abdominal sonogram read    Discussed with attending, Dr. Aki Delacruz PGY1    Addendum 4/15/23 @730p  RUQ US resulted     < from: US Abdomen Complete (US Abdomen Complete .) (04.15.23 @ 17:44) >    US ABDOMEN COMPLETE   ORDERED BY:  OCTAVIANO CARBONE     PROCEDURE DATE:  04/15/2023          INTERPRETATION:  CLINICAL INFORMATION: Right upper quadrant pain, 38   weeks pregnant.    COMPARISON: None available.    TECHNIQUE: Sonography of the abdomen.    FINDINGS:  Liver: Within normal limits.  Bile ducts: Normal caliber. Common bile duct measures 6 mm.  Gallbladder: Small gallstones.. Positive sonographic Damian's sign. No   significant gallbladder wall thickening or pericholecystic fluid.  Pancreas: Poorly visualized.  Spleen: 11 cm. Within normal limits.  Right kidney: 10.8 cm. No hydronephrosis.  Left kidney: 11.5 cm. No hydronephrosis.  Ascites: None.  Aorta and IVC: Visualized portions are within normal limits.    IMPRESSION:  Cholelithiasis. Positive sonographic Damian sign. No gallbladder wall   thickening or pericholecystic fluid. Findings are equivocal for acute   cholecystitis.      --- End of Report ---        < end of copied text >    Patient still having significant pain.   Plan for gen surg consult for possible acute cholecystitis  Patient scanned and is vertex. Patient would still desire a primary  section electively.     Peggy Mercado, PGY2  d/w Dr. Prabhakar    addendum  Patient examined at bedside  significant RUQ pain  mildly elevated liver enzymes  will deliver at 39 weeks  patient for elective  on   will deliver at 39 weeks, management of cholecystitis by gen surg

## 2023-04-15 NOTE — OB RN TRIAGE NOTE - NSICDXPASTSURGICALHX_GEN_ALL_CORE_FT
PAST SURGICAL HISTORY:  H/O dilation and curettage     S/P colonoscopy with polypectomy     S/P tooth extraction     Status post hysteroscopic resection of uterine septum

## 2023-04-15 NOTE — OB PROVIDER H&P - HISTORY OF PRESENT ILLNESS
OB Provider Triage note    Subjective  HPI: [id number]  @38w6d gestational age presents for severe constant RUQ pain for the past 4 days. Describes pain as sharp and radiates to the back on the right side. Denies associated nausea but has had episodes of vomiting while coughing. Pt has had upper respiratory sxs with intense coughing fits for the past 2wks which is now improving. Denies headache, vision changes, chest pain, SOB. Denies dysuria, hematuria, fever, chills.    She states the RUQ pain worsens with movements and deep inhalation. She has tried Tylenol at home with little relief.    +FM. -LOF. -CTXs. -VB. Pt denies any other concerns.    – PNC: Subchorionic hematoma now resolved, denies other prenatal issues. GBS negative.  EFW 6#13 this week.    – OB Hx:  G1 (10/2021): MAB s/p D&C  G2 (2022): MAB s/p D&C    – GYN Hx: uterine septum s/p repair x2, denies abnormal pap smears, fibroids, polyps, ovarian cysts, PCOS, Endometriosis, STIs    – PMH: Anemia, anxiety/depression  – Meds: PNV, bASA, Pepcid, Vit D, Lexapro 30mg daily  – Allergies: Erythromycin (reaction unk)  – PSHx: D&C x2, Uterine septum repair x2  – Social: denies t/a/d Subjective  HPI: [id number]  @38w6d gestational age presents for severe constant RUQ pain for the past 4 days. Describes pain as sharp and radiates to the back on the right side. Denies associated nausea but has had episodes of vomiting while coughing. Pt has had upper respiratory sxs with intense coughing fits for the past 2wks which is now improving. Denies headache, vision changes, chest pain, SOB. Denies dysuria, hematuria, fever, chills.    She states the RUQ pain worsens with movements and deep inhalation. She has tried Tylenol at home with little relief.    +FM. -LOF. -CTXs. -VB. Pt denies any other concerns.    – PNC: Subchorionic hematoma now resolved, denies other prenatal issues. GBS negative.  EFW 6#13 this week.    – OB Hx:  G1 (10/2021): MAB s/p D&C  G2 (2022): MAB s/p D&C    – GYN Hx: uterine septum s/p repair x2, denies abnormal pap smears, fibroids, polyps, ovarian cysts, PCOS, Endometriosis, STIs    – PMH: Anemia, anxiety/depression  – Meds: PNV, bASA, Pepcid, Vit D, Lexapro 30mg daily  – Allergies: Erythromycin (reaction unk)  – PSHx: D&C x2, Uterine septum repair x2  – Social: denies t/a/d

## 2023-04-15 NOTE — OB PROVIDER TRIAGE NOTE - HISTORY OF PRESENT ILLNESS
OB Provider Triage note    Subjective  HPI: [id number]  @38w6d gestational age presents for severe constant RUQ pain for the past 4 days. Describes pain as sharp and radiates to the back on the right side. Denies associated nausea but has had episodes of vomiting while coughing. Pt has had upper respiratory sxs with intense coughing fits for the past 2wks which is now improving. Denies headache, vision changes, chest pain, SOB. Denies dysuria, hematuria, fever, chills.    She states the RUQ pain worsens with movements and deep inhalation. She has tried Tylenol at home with little relief.    +FM. -LOF. -CTXs. -VB. Pt denies any other concerns.    – PNC: Subchorionic hematoma now resolved, denies other prenatal issues. GBS negative.  EFW 6#13 this week.    – OB Hx:  G1 (10/2021): MAB s/p D&C  G2 (2022): MAB s/p D&C    – GYN Hx: uterine septum s/p repair x2, denies abnormal pap smears, fibroids, polyps, ovarian cysts, PCOS, Endometriosis, STIs    – PMH: Anemia, anxiety/depression  – Meds: PNV, bASA, Pepcid, Vit D, Lexapro 30mg daily  – Allergies: Erythromycin (reaction unk)  – PSHx: D&C x2, Uterine septum repair x2  – Social: denies t/a/d

## 2023-04-15 NOTE — OB RN PATIENT PROFILE - FUNCTIONAL ASSESSMENT - DAILY ACTIVITY 1.
[de-identified] : 59 y/o man presents for annual physical exam. he feels well overall currently, no new concerns. he has though noted some issues w dry mouth which is due to sleeping w his mouth open, he has been told he may have sleep apnea in the past. no significant daytime somnolence. \par \par occasional mild vertigo symptoms, rare. takes meclizine occasionally prn. \par \par HTN well controlled on rx \par hyperlipidemia controlled on statin\par weight is unchanged\par \par he is very hesitant to consider colonoscopy screening, not ready to do it at this time. he would like to consider alternative CRC screening 
4 = No assist / stand by assistance

## 2023-04-15 NOTE — OB RN PATIENT PROFILE - PRO VDRL INFANT
Complex Repair And Tissue Cultured Epidermal Autograft Text: The defect edges were debeveled with a #15 scalpel blade.  The primary defect was closed partially with a complex linear closure.  Given the location of the defect, shape of the defect and the proximity to free margins an tissue cultured epidermal autograft was deemed most appropriate to repair the remaining defect.  The graft was trimmed to fit the size of the remaining defect.  The graft was then placed in the primary defect, oriented appropriately, and sutured into place. negative

## 2023-04-15 NOTE — OB PROVIDER H&P - ASSESSMENT
A/P: Pt is a 37  @ 38.6wga initially presenting for RUQ abdominal pain with evaluation equivocal for cholecystis. Patient pending evaluation by general surgery. Nevertheless, patient to be admitted for elective pLTCS in the      Fernando Redmond, PGY-1  Obstetrics and Gynecology    Discussed with Dr. Prabhakar

## 2023-04-15 NOTE — OB RN PATIENT PROFILE - LIVING CHILDREN, OB PROFILE
0

## 2023-04-15 NOTE — OB PROVIDER H&P - NS_BABIESUTERO_OBGYN_ALL_OB_NU
[facility]  Progress Note    Kala Estes Patient Status:  Inpatient    2017 MRN [id number]   Location [address] [place] [address] 1SE-B Attending Chris Thomas DO   Hosp Day # 4 PCP Nishant Yan DO     Follow up:  Status asthmaticus    Subjective Phosphate (ORAPRED) solution 16.5 mg 1 mg/kg Oral Q12H   albuterol sulfate (VENTOLIN) (2.5 MG/3ML) 0.083% nebulizer solution 5 mg 5 mg Nebulization Q2H   Montelukast Sodium (SINGULAIR) chewable tab 4 mg 4 mg Oral Nightly       Assessment:  Patient is a 2 y 1

## 2023-04-15 NOTE — OB RN PATIENT PROFILE - FALL HARM RISK - UNIVERSAL INTERVENTIONS
Bed in lowest position, wheels locked, appropriate side rails in place/Call bell, personal items and telephone in reach/Instruct patient to call for assistance before getting out of bed or chair/Non-slip footwear when patient is out of bed/[place] to call system/Physically safe environment - no spills, clutter or unnecessary equipment/Purposeful Proactive Rounding/Room/bathroom lighting operational, light cord in reach

## 2023-04-15 NOTE — OB RN TRIAGE NOTE - NSICDXPASTMEDICALHX_GEN_ALL_CORE_FT
PAST MEDICAL HISTORY:  2019 novel coronavirus disease (COVID-19)     Anemia     Anxiety     Currently pregnant     Depression     H/O heartburn     H/O Helicobacter infection     H/O hemorrhoids     Uterine septum

## 2023-04-15 NOTE — OB PROVIDER H&P - NSLOWPPHRISK_OBGYN_A_OB
No previous uterine incision/Ward Pregnancy/Less than or equal to 4 previous vaginal births/No known bleeding disorder/No history of postpartum hemorrhage/No other PPH risks indicated

## 2023-04-15 NOTE — OB RN PATIENT PROFILE - AS SC BRADEN SENSORY
Placed call to patient regarding transportaion not arrived and she has an appt at 315 with Dr. Cohen but no answer from patient     ----- Message from Berna Claudio sent at 9/15/2020  3:24 PM CDT -----  Regarding: TRANSPORTATION  Contact: PATIENT  PATIENT RIDE HAVE NOT COME STILL WAITING. PLEASE CALL PATIENT @ [phone]. THANKS      
(4) no impairment

## 2023-04-16 ENCOUNTER — TRANSCRIPTION ENCOUNTER (OUTPATIENT)
Age: 38
End: 2023-04-16

## 2023-04-16 LAB
COVID-19 SPIKE DOMAIN AB INTERP: POSITIVE
COVID-19 SPIKE DOMAIN ANTIBODY RESULT: >250 U/ML — HIGH
SARS-COV-2 IGG+IGM SERPL QL IA: >250 U/ML — HIGH
SARS-COV-2 IGG+IGM SERPL QL IA: POSITIVE

## 2023-04-16 PROCEDURE — 99222 1ST HOSP IP/OBS MODERATE 55: CPT

## 2023-04-16 DEVICE — SURGICEL POWDER 3 GRAMS: Type: IMPLANTABLE DEVICE | Status: FUNCTIONAL

## 2023-04-16 RX ORDER — NALOXONE HYDROCHLORIDE 4 MG/.1ML
0.1 SPRAY NASAL
Refills: 0 | Status: DISCONTINUED | OUTPATIENT
Start: 2023-04-16 | End: 2023-04-18

## 2023-04-16 RX ORDER — ACETAMINOPHEN 500 MG
975 TABLET ORAL
Refills: 0 | Status: DISCONTINUED | OUTPATIENT
Start: 2023-04-16 | End: 2023-04-16

## 2023-04-16 RX ORDER — CITRIC ACID/SODIUM CITRATE 300-500 MG
30 SOLUTION, ORAL ORAL ONCE
Refills: 0 | Status: COMPLETED | OUTPATIENT
Start: 2023-04-16 | End: 2023-04-16

## 2023-04-16 RX ORDER — OXYCODONE HYDROCHLORIDE 5 MG/1
5 TABLET ORAL ONCE
Refills: 0 | Status: DISCONTINUED | OUTPATIENT
Start: 2023-04-16 | End: 2023-04-18

## 2023-04-16 RX ORDER — ESCITALOPRAM OXALATE 10 MG/1
30 TABLET, FILM COATED ORAL DAILY
Refills: 0 | Status: DISCONTINUED | OUTPATIENT
Start: 2023-04-16 | End: 2023-04-18

## 2023-04-16 RX ORDER — DIPHENHYDRAMINE HCL 50 MG
25 CAPSULE ORAL EVERY 6 HOURS
Refills: 0 | Status: COMPLETED | OUTPATIENT
Start: 2023-04-16 | End: 2024-03-14

## 2023-04-16 RX ORDER — IBUPROFEN 200 MG
600 TABLET ORAL EVERY 6 HOURS
Refills: 0 | Status: DISCONTINUED | OUTPATIENT
Start: 2023-04-16 | End: 2023-04-18

## 2023-04-16 RX ORDER — ONDANSETRON 8 MG/1
4 TABLET, FILM COATED ORAL ONCE
Refills: 0 | Status: COMPLETED | OUTPATIENT
Start: 2023-04-16 | End: 2023-04-16

## 2023-04-16 RX ORDER — ACETAMINOPHEN 500 MG
3 TABLET ORAL
Qty: 0 | Refills: 0 | DISCHARGE
Start: 2023-04-16

## 2023-04-16 RX ORDER — ESCITALOPRAM OXALATE 10 MG/1
20 TABLET, FILM COATED ORAL DAILY
Refills: 0 | Status: DISCONTINUED | OUTPATIENT
Start: 2023-04-16 | End: 2023-04-16

## 2023-04-16 RX ORDER — NALBUPHINE HYDROCHLORIDE 10 MG/ML
2.5 INJECTION, SOLUTION INTRAMUSCULAR; INTRAVENOUS; SUBCUTANEOUS EVERY 6 HOURS
Refills: 0 | Status: DISCONTINUED | OUTPATIENT
Start: 2023-04-16 | End: 2023-04-20

## 2023-04-16 RX ORDER — ACETAMINOPHEN 500 MG
975 TABLET ORAL
Refills: 0 | Status: DISCONTINUED | OUTPATIENT
Start: 2023-04-16 | End: 2023-04-18

## 2023-04-16 RX ORDER — TETANUS TOXOID, REDUCED DIPHTHERIA TOXOID AND ACELLULAR PERTUSSIS VACCINE, ADSORBED 5; 2.5; 8; 8; 2.5 [IU]/.5ML; [IU]/.5ML; UG/.5ML; UG/.5ML; UG/.5ML
0.5 SUSPENSION INTRAMUSCULAR ONCE
Refills: 0 | Status: DISCONTINUED | OUTPATIENT
Start: 2023-04-16 | End: 2023-04-18

## 2023-04-16 RX ORDER — OXYCODONE HYDROCHLORIDE 5 MG/1
5 TABLET ORAL
Refills: 0 | Status: DISCONTINUED | OUTPATIENT
Start: 2023-04-16 | End: 2023-04-18

## 2023-04-16 RX ORDER — SODIUM CHLORIDE 9 MG/ML
1000 INJECTION, SOLUTION INTRAVENOUS
Refills: 0 | Status: DISCONTINUED | OUTPATIENT
Start: 2023-04-16 | End: 2023-04-18

## 2023-04-16 RX ORDER — DEXAMETHASONE 0.5 MG/5ML
4 ELIXIR ORAL EVERY 6 HOURS
Refills: 0 | Status: DISCONTINUED | OUTPATIENT
Start: 2023-04-16 | End: 2023-04-20

## 2023-04-16 RX ORDER — ONDANSETRON 8 MG/1
4 TABLET, FILM COATED ORAL EVERY 6 HOURS
Refills: 0 | Status: COMPLETED | OUTPATIENT
Start: 2023-04-16 | End: 2023-04-17

## 2023-04-16 RX ORDER — DIPHENHYDRAMINE HCL 50 MG
25 CAPSULE ORAL EVERY 6 HOURS
Refills: 0 | Status: DISCONTINUED | OUTPATIENT
Start: 2023-04-16 | End: 2023-04-18

## 2023-04-16 RX ORDER — ASPIRIN/CALCIUM CARB/MAGNESIUM 324 MG
1 TABLET ORAL
Refills: 0 | DISCHARGE

## 2023-04-16 RX ORDER — HEPARIN SODIUM 5000 [USP'U]/ML
5000 INJECTION INTRAVENOUS; SUBCUTANEOUS EVERY 12 HOURS
Refills: 0 | Status: DISCONTINUED | OUTPATIENT
Start: 2023-04-16 | End: 2023-04-18

## 2023-04-16 RX ORDER — ESCITALOPRAM OXALATE 10 MG/1
30 TABLET, FILM COATED ORAL DAILY
Refills: 0 | Status: DISCONTINUED | OUTPATIENT
Start: 2023-04-16 | End: 2023-04-16

## 2023-04-16 RX ORDER — IBUPROFEN 200 MG
600 TABLET ORAL EVERY 6 HOURS
Refills: 0 | Status: COMPLETED | OUTPATIENT
Start: 2023-04-16 | End: 2024-03-14

## 2023-04-16 RX ORDER — FAMOTIDINE 10 MG/ML
1 INJECTION INTRAVENOUS
Refills: 0 | DISCHARGE

## 2023-04-16 RX ORDER — KETOROLAC TROMETHAMINE 30 MG/ML
30 SYRINGE (ML) INJECTION EVERY 6 HOURS
Refills: 0 | Status: DISCONTINUED | OUTPATIENT
Start: 2023-04-16 | End: 2023-04-17

## 2023-04-16 RX ORDER — MORPHINE SULFATE 50 MG/1
0.15 CAPSULE, EXTENDED RELEASE ORAL ONCE
Refills: 0 | Status: DISCONTINUED | OUTPATIENT
Start: 2023-04-16 | End: 2023-04-17

## 2023-04-16 RX ORDER — CHLORHEXIDINE GLUCONATE 213 G/1000ML
1 SOLUTION TOPICAL ONCE
Refills: 0 | Status: COMPLETED | OUTPATIENT
Start: 2023-04-16 | End: 2023-04-16

## 2023-04-16 RX ORDER — OXYCODONE HYDROCHLORIDE 5 MG/1
5 TABLET ORAL
Refills: 0 | Status: COMPLETED | OUTPATIENT
Start: 2023-04-16 | End: 2023-04-23

## 2023-04-16 RX ORDER — MAGNESIUM HYDROXIDE 400 MG/1
30 TABLET, CHEWABLE ORAL
Refills: 0 | Status: DISCONTINUED | OUTPATIENT
Start: 2023-04-16 | End: 2023-04-18

## 2023-04-16 RX ORDER — ESCITALOPRAM OXALATE 10 MG/1
10 TABLET, FILM COATED ORAL DAILY
Refills: 0 | Status: DISCONTINUED | OUTPATIENT
Start: 2023-04-16 | End: 2023-04-16

## 2023-04-16 RX ORDER — SIMETHICONE 80 MG/1
80 TABLET, CHEWABLE ORAL EVERY 4 HOURS
Refills: 0 | Status: DISCONTINUED | OUTPATIENT
Start: 2023-04-16 | End: 2023-04-18

## 2023-04-16 RX ORDER — FAMOTIDINE 10 MG/ML
20 INJECTION INTRAVENOUS ONCE
Refills: 0 | Status: COMPLETED | OUTPATIENT
Start: 2023-04-16 | End: 2023-04-16

## 2023-04-16 RX ORDER — IBUPROFEN 200 MG
1 TABLET ORAL
Qty: 0 | Refills: 0 | DISCHARGE
Start: 2023-04-16

## 2023-04-16 RX ORDER — LANOLIN
1 OINTMENT (GRAM) TOPICAL EVERY 6 HOURS
Refills: 0 | Status: DISCONTINUED | OUTPATIENT
Start: 2023-04-16 | End: 2023-04-18

## 2023-04-16 RX ADMIN — Medication 30 MILLIGRAM(S): at 22:03

## 2023-04-16 RX ADMIN — Medication 30 MILLIGRAM(S): at 22:33

## 2023-04-16 RX ADMIN — CHLORHEXIDINE GLUCONATE 1 APPLICATION(S): 213 SOLUTION TOPICAL at 10:17

## 2023-04-16 RX ADMIN — Medication 30 MILLILITER(S): at 10:53

## 2023-04-16 RX ADMIN — Medication 975 MILLIGRAM(S): at 23:59

## 2023-04-16 RX ADMIN — FAMOTIDINE 20 MILLIGRAM(S): 10 INJECTION INTRAVENOUS at 07:40

## 2023-04-16 RX ADMIN — Medication 975 MILLIGRAM(S): at 17:55

## 2023-04-16 RX ADMIN — Medication 975 MILLIGRAM(S): at 03:26

## 2023-04-16 RX ADMIN — HEPARIN SODIUM 5000 UNIT(S): 5000 INJECTION INTRAVENOUS; SUBCUTANEOUS at 22:03

## 2023-04-16 RX ADMIN — ONDANSETRON 4 MILLIGRAM(S): 8 TABLET, FILM COATED ORAL at 03:34

## 2023-04-16 RX ADMIN — ESCITALOPRAM OXALATE 30 MILLIGRAM(S): 10 TABLET, FILM COATED ORAL at 10:00

## 2023-04-16 RX ADMIN — Medication 975 MILLIGRAM(S): at 04:00

## 2023-04-16 RX ADMIN — Medication 975 MILLIGRAM(S): at 18:40

## 2023-04-16 NOTE — OB RN INTRAOPERATIVE NOTE - NSSELHIDDEN_OBGYN_ALL_OB_FT
[NS_DeliveryAttending1_OBGYN_ALL_OB_FT:[id number]],[NS_DeliveryAssist1_OBGYN_ALL_OB_FT:[id number]],[NS_DeliveryAssist2_OBGYN_ALL_OB_FT:[id number]],[NS_DeliveryRN_OBGYN_ALL_OB_FT:[id number]] [NS_DeliveryAttending1_OBGYN_ALL_OB_FT:[id number]=],[NS_DeliveryAssist1_OBGYN_ALL_OB_FT:[id number]=],[NS_DeliveryAssist2_OBGYN_ALL_OB_FT:[id number]=],[NS_DeliveryRN_OBGYN_ALL_OB_FT:[id number]=],[NS_DeliveryAttending2_OBGYN_ALL_OB_FT:[id number]=]

## 2023-04-16 NOTE — CONSULT NOTE ADULT - ASSESSMENT
36 y/o F p/w w/ possible acute cholecystitis.    -No acute surgical intervention  -HIDA scan after definitive delivery of uterus as HIDA is contraindicated during pregnancy  -Regular low fat diet as tolerated and pain control  -Possible lap cholecystectomy this admission if HIDA positive    D/w Dr. Marta Bosch MD  PGY-2  ACS

## 2023-04-16 NOTE — OB PROVIDER DELIVERY SUMMARY - NSLOWPPHRISK_OBGYN_A_OB
No previous uterine incision/Ward Pregnancy/Less than or equal to 4 previous vaginal births/No known bleeding disorder/No history of postpartum hemorrhage

## 2023-04-16 NOTE — OB PROVIDER DELIVERY SUMMARY - NSSELHIDDEN_OBGYN_ALL_OB_FT
[NS_DeliveryAttending1_OBGYN_ALL_OB_FT:[id number]],[NS_DeliveryAssist1_OBGYN_ALL_OB_FT:[id number]],[NS_DeliveryAssist2_OBGYN_ALL_OB_FT:[id number]=],[NS_DeliveryRN_OBGYN_ALL_OB_FT:[id number]=]

## 2023-04-16 NOTE — DISCHARGE NOTE OB - PATIENT PORTAL LINK FT
PAST MEDICAL HISTORY:  Anemia     Aspiration pneumonia     Benign prostatic hyperplasia with urinary obstruction     COPD (chronic obstructive pulmonary disease)     Esophageal stricture     History of carotid stenosis     Hyperlipidemia, unspecified hyperlipidemia type     Hypothyroidism     Laryngeal carcinoma     Microalbuminuria     Prostate CA     Syncope and collapse     Traumatic pneumothorax     
You can access the FollowMyHealth Patient Portal offered by [facility] by registering at the following website: [email or web address]. By joining [facility]’s FollowMyHealth portal, you will also be able to view your health information using other applications (apps) compatible with our system.

## 2023-04-16 NOTE — DISCHARGE NOTE OB - CARE PROVIDER_API CALL
Mellissa Prabhakar)  Obstetrics and Gynecology  [address] [place] [address]  Phone: [phone]  Fax: [phone]  Follow Up Time:

## 2023-04-16 NOTE — OB RN DELIVERY SUMMARY - NSSELHIDDEN_OBGYN_ALL_OB_FT
[NS_DeliveryAttending1_OBGYN_ALL_OB_FT:[id number]],[NS_DeliveryAssist1_OBGYN_ALL_OB_FT:[id number]],[NS_DeliveryAssist2_OBGYN_ALL_OB_FT:[id number]=],[NS_DeliveryRN_OBGYN_ALL_OB_FT:[id number]=]

## 2023-04-16 NOTE — DISCHARGE NOTE OB - CARE PLAN
1 Principal Discharge DX:	 delivery delivered  Assessment and plan of treatment:	Regular diet  activity as tolerated and outlined below  Follow up as outpatient

## 2023-04-16 NOTE — OB PROVIDER DELIVERY SUMMARY - NSPROVIDERDELIVERYNOTE_OBGYN_ALL_OB_FT
viable male infant, LOT presentation, weight 6#4, APGARS 8/9  grossly normal b/l tubes and ovaries. Septate uterus   hysterotomy closed in 1 layer using PDS. An area of serosal bleeding on L. hysterotomy reinforced with 2.0 monocryl   Surgicell powder applied over hysterotomy     rectus muscle reapproximated with chromic suture  Fascia reapproximated with 0 vicryl   sub-cutaneous tissue reapproximaed with plain gutt   Skin reapproximated with Quill     800/2750/250    Dictation #: viable male infant, LOT presentation, weight 6#4, APGARS 8/9  grossly normal b/l tubes and ovaries. Septate uterus   hysterotomy closed in 1 layer using PDS. An area of serosal bleeding on L. hysterotomy reinforced with 2.0 monocryl   Surgicell powder applied over hysterotomy     rectus muscle reapproximated with chromic suture  Fascia reapproximated with 0 vicryl   sub-cutaneous tissue reapproximaed with plain gutt   Skin reapproximated with Quill     800/2750/250    Dictation #:[id number]

## 2023-04-16 NOTE — DISCHARGE NOTE OB - NS MD DC FALL RISK RISK
For information on Fall & Injury Prevention, visit: [email or web address] OR  [email or web address] OR  https://www.cdc.gov/steadi/patient.html

## 2023-04-16 NOTE — OB NEONATOLOGY/PEDIATRICIAN DELIVERY SUMMARY - NSPEDSNEONOTESA_OBGYN_ALL_OB_FT
*PRELIM*    Baby boy born at 39w0d via primary elective C/S to a 36 y/o  mother. Maternal history of gallstones, MISC x2, anemia s/p iron infusions, anxiety/depression on lexapro. This is IVF pregnancy with donor egg.  Maternal blood type __. Prenatal labs: Hepatitis B negative, HIV negative, RPR negative, Rubella immune. GBS negative / positive on __. S/AROM at __ with clear / meconium-tinged fluid. Baby emerged vigorous, crying, was warmed, dried, stimulated and suctioned with APGARS of   .   Mom would like to breast/bottle feed, consents to /refuses Hepatitis B vaccine and requests / declines circumcision. EOS not applicable (no labor, and ROM at time of delivery).

## 2023-04-16 NOTE — DISCHARGE NOTE OB - MEDICATION SUMMARY - MEDICATIONS TO STOP TAKING
I will STOP taking the medications listed below when I get home from the hospital:    aspirin 81 mg oral tablet  -- 1 by mouth once a day

## 2023-04-16 NOTE — DISCHARGE NOTE OB - HOSPITAL COURSE
Pt presented at 38+6 weeks with severe RUQ pain and found to have gallstones. She was evaluated by general surgery who recommends follow up post delivery. She underwent a C/S without any complications. Her postpartum course was uneventful. She met all her milestones in regards to her vitals, postpartum labs, diet, ambulation, pain management. Follow up discussed. Pt was discharged home on POD#

## 2023-04-16 NOTE — OB RN DELIVERY SUMMARY - NS_SEPSISRSKCALC_OBGYN_ALL_OB_FT
EOS calculated successfully. EOS Risk Factor: 0.5/1000 live births ([facility] national incidence); GA=39w;Temp=97.9; ROM=0.05; GBS='Unknown'; Antibiotics='No antibiotics or any antibiotics < 2 hrs prior to birth'

## 2023-04-16 NOTE — DISCHARGE NOTE OB - MEDICATION SUMMARY - MEDICATIONS TO TAKE
[Disease: _____________________] : Disease: [unfilled] [T: ___] : T[unfilled] [N: ___] : N[unfilled] [M: ___] : M[unfilled] [AJCC Stage: ____] : AJCC Stage: [unfilled] [de-identified] : 47 M with h/o chronic hepatitis B on entecavir presents for further management of resected high risk Stage III (T3/4N2), DAVIE R colon cancer, \par \par Bobby was admitted to [facility] on 8/2/20 with acute onset abdominal pain and fever. CT A/P showed circumferential mural thickening of the ascending colon with adjacent enlarged mesenteric LN. Primary consideration was a neoplasm. A colonoscopy on 8/4/20 showed an obstructing mass in the ascending colon. 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 On Aug 7, 2020, underwent a hemicolectomy, excised portion of small bowel. BCx from admission showed Clostridum septicum but repeat cultures were negative. Completed 2 weeks of antibiotics. Post op course otherwise was unremarkable. \par \par Referred to medical oncology for adjuvant therapy. \par \par 8/24/20: CT Chest: 4 mm LL nodule, 3 mos scan f/u to determine stability \par 9/2-2/3/21:C1-12 FOLFOX (omitted Oxaliplatin C12)\par 11/11/20 C5 (cytokine storm during Oxali infusion characterized by hypotension resolved with IVF)\par 11/14/20: CT CAP: pulm nodule unchanged, no mets\par 11/25/20: C6 (to run NS @100 ml/hr along with Oxali over 4 hours)\par 2/27/21: CT CAP: AMANDEEP [de-identified] : moderately invasive adenocarcinoma  [FreeTextEntry1] : completed 6 mos of adjuvant FOLFOX  [de-identified] : here for f/u after completion of adjuvant therapy. had port flush today. overall feels well. Still with neuropathy, but does not interfere with function. gained weight. \par Energy level is stable. Still doing acupuncture.  I will START or STAY ON the medications listed below when I get home from the hospital:    vitamin D3  -- 2000 IU  -- Indication: For nutrition support    ibuprofen 600 mg oral tablet  -- 1 tab(s) by mouth every 6 hours  -- Indication: For as needed for pain    ibuprofen 600 mg oral tablet  -- 1 tab(s) by mouth every 6 hours  -- Indication: For as needed for pain    acetaminophen 325 mg oral tablet  -- 3 tab(s) by mouth every 6 hours as needed for  moderate pain  -- Indication: For as needed for pain    oxyCODONE 5 mg oral tablet  -- 1 tab(s) by mouth every 4 hours As needed Severe Pain (7 - 10)  -- Indication: For as needed for breakthrough pain    Lexapro 20 mg oral tablet  -- 1 by mouth once a day pt takes 30 mg  -- Indication: For mood disorder

## 2023-04-17 ENCOUNTER — TRANSCRIPTION ENCOUNTER (OUTPATIENT)
Age: 38
End: 2023-04-17

## 2023-04-17 LAB
ALBUMIN SERPL ELPH-MCNC: 2.6 G/DL — LOW (ref 3.3–5)
ALP SERPL-CCNC: 87 U/L — SIGNIFICANT CHANGE UP (ref 40–120)
ALT FLD-CCNC: 66 U/L — HIGH (ref 10–45)
ANION GAP SERPL CALC-SCNC: 8 MMOL/L — SIGNIFICANT CHANGE UP (ref 5–17)
AST SERPL-CCNC: 36 U/L — SIGNIFICANT CHANGE UP (ref 10–40)
BASOPHILS # BLD AUTO: 0.03 K/UL — SIGNIFICANT CHANGE UP (ref 0–0.2)
BASOPHILS NFR BLD AUTO: 0.2 % — SIGNIFICANT CHANGE UP (ref 0–2)
BILIRUB SERPL-MCNC: 0.2 MG/DL — SIGNIFICANT CHANGE UP (ref 0.2–1.2)
BUN SERPL-MCNC: 9 MG/DL — SIGNIFICANT CHANGE UP (ref 7–23)
CALCIUM SERPL-MCNC: 8.5 MG/DL — SIGNIFICANT CHANGE UP (ref 8.4–10.5)
CHLORIDE SERPL-SCNC: 105 MMOL/L — SIGNIFICANT CHANGE UP (ref 96–108)
CO2 SERPL-SCNC: 24 MMOL/L — SIGNIFICANT CHANGE UP (ref 22–31)
CREAT SERPL-MCNC: 0.68 MG/DL — SIGNIFICANT CHANGE UP (ref 0.5–1.3)
EGFR: 115 ML/MIN/1.73M2 — SIGNIFICANT CHANGE UP
EOSINOPHIL # BLD AUTO: 0.07 K/UL — SIGNIFICANT CHANGE UP (ref 0–0.5)
EOSINOPHIL NFR BLD AUTO: 0.5 % — SIGNIFICANT CHANGE UP (ref 0–6)
GLUCOSE SERPL-MCNC: 93 MG/DL — SIGNIFICANT CHANGE UP (ref 70–99)
HCT VFR BLD CALC: 31 % — LOW (ref 34.5–45)
HGB BLD-MCNC: 10.1 G/DL — LOW (ref 11.5–15.5)
IMM GRANULOCYTES NFR BLD AUTO: 0.6 % — SIGNIFICANT CHANGE UP (ref 0–0.9)
LYMPHOCYTES # BLD AUTO: 14 % — SIGNIFICANT CHANGE UP (ref 13–44)
LYMPHOCYTES # BLD AUTO: 2 K/UL — SIGNIFICANT CHANGE UP (ref 1–3.3)
MCHC RBC-ENTMCNC: 28.9 PG — SIGNIFICANT CHANGE UP (ref 27–34)
MCHC RBC-ENTMCNC: 32.6 GM/DL — SIGNIFICANT CHANGE UP (ref 32–36)
MCV RBC AUTO: 88.6 FL — SIGNIFICANT CHANGE UP (ref 80–100)
MONOCYTES # BLD AUTO: 0.9 K/UL — SIGNIFICANT CHANGE UP (ref 0–0.9)
MONOCYTES NFR BLD AUTO: 6.3 % — SIGNIFICANT CHANGE UP (ref 2–14)
NEUTROPHILS # BLD AUTO: 11.22 K/UL — HIGH (ref 1.8–7.4)
NEUTROPHILS NFR BLD AUTO: 78.4 % — HIGH (ref 43–77)
NRBC # BLD: 0 /100 WBCS — SIGNIFICANT CHANGE UP (ref 0–0)
PLATELET # BLD AUTO: 150 K/UL — SIGNIFICANT CHANGE UP (ref 150–400)
POTASSIUM SERPL-MCNC: 4.5 MMOL/L — SIGNIFICANT CHANGE UP (ref 3.5–5.3)
POTASSIUM SERPL-SCNC: 4.5 MMOL/L — SIGNIFICANT CHANGE UP (ref 3.5–5.3)
PROT SERPL-MCNC: 4.9 G/DL — LOW (ref 6–8.3)
RBC # BLD: 3.5 M/UL — LOW (ref 3.8–5.2)
RBC # FLD: 16.9 % — HIGH (ref 10.3–14.5)
SODIUM SERPL-SCNC: 137 MMOL/L — SIGNIFICANT CHANGE UP (ref 135–145)
T PALLIDUM AB TITR SER: NEGATIVE — SIGNIFICANT CHANGE UP
WBC # BLD: 14.31 K/UL — HIGH (ref 3.8–10.5)
WBC # FLD AUTO: 14.31 K/UL — HIGH (ref 3.8–10.5)

## 2023-04-17 RX ORDER — SODIUM CHLORIDE 9 MG/ML
1000 INJECTION, SOLUTION INTRAVENOUS ONCE
Refills: 0 | Status: COMPLETED | OUTPATIENT
Start: 2023-04-17 | End: 2023-04-17

## 2023-04-17 RX ADMIN — Medication 975 MILLIGRAM(S): at 18:55

## 2023-04-17 RX ADMIN — Medication 30 MILLIGRAM(S): at 10:32

## 2023-04-17 RX ADMIN — Medication 600 MILLIGRAM(S): at 18:00

## 2023-04-17 RX ADMIN — Medication 30 MILLIGRAM(S): at 10:02

## 2023-04-17 RX ADMIN — Medication 975 MILLIGRAM(S): at 05:43

## 2023-04-17 RX ADMIN — Medication 975 MILLIGRAM(S): at 13:53

## 2023-04-17 RX ADMIN — Medication 975 MILLIGRAM(S): at 06:13

## 2023-04-17 RX ADMIN — SODIUM CHLORIDE 1000 MILLILITER(S): 9 INJECTION, SOLUTION INTRAVENOUS at 02:55

## 2023-04-17 RX ADMIN — OXYCODONE HYDROCHLORIDE 5 MILLIGRAM(S): 5 TABLET ORAL at 21:03

## 2023-04-17 RX ADMIN — HEPARIN SODIUM 5000 UNIT(S): 5000 INJECTION INTRAVENOUS; SUBCUTANEOUS at 10:01

## 2023-04-17 RX ADMIN — ESCITALOPRAM OXALATE 30 MILLIGRAM(S): 10 TABLET, FILM COATED ORAL at 10:03

## 2023-04-17 RX ADMIN — Medication 975 MILLIGRAM(S): at 14:30

## 2023-04-17 RX ADMIN — HEPARIN SODIUM 5000 UNIT(S): 5000 INJECTION INTRAVENOUS; SUBCUTANEOUS at 21:03

## 2023-04-17 RX ADMIN — OXYCODONE HYDROCHLORIDE 5 MILLIGRAM(S): 5 TABLET ORAL at 06:58

## 2023-04-17 RX ADMIN — Medication 30 MILLIGRAM(S): at 02:30

## 2023-04-17 RX ADMIN — Medication 975 MILLIGRAM(S): at 00:29

## 2023-04-17 RX ADMIN — Medication 600 MILLIGRAM(S): at 17:23

## 2023-04-17 RX ADMIN — Medication 30 MILLIGRAM(S): at 03:00

## 2023-04-17 RX ADMIN — OXYCODONE HYDROCHLORIDE 5 MILLIGRAM(S): 5 TABLET ORAL at 22:03

## 2023-04-17 NOTE — CONSULT NOTE ADULT - ASSESSMENT
ASSESSMENT: Patient is a 37y old f that presented at 38w6d with severe RUQ pain, with imaging findings likely to be from symptomatic cholelithiasis. Patient underwent C section and delivery of baby. Surgical consultation was requested by the patient for Dr. Petit to evaluate her biliary colic symptoms and the timing of lap shane.    PLAN:    - no acute surgical intervention indicated  - recommend pain control of biliary colic, although now it seems to be subsided   - labs reviewed: LFTs normal, increased WBC likely secondary to recent C sec  - HIDA scan unlikely to change course of management   - recommend outpatient follow up    - would hold off at least 2 weeks prior to offering a lap shane     - Recommendations given to patient and her wife at bedside   - Plan discussed with Attending, Dr. Petit      [facility]  [id number]

## 2023-04-17 NOTE — CONSULT NOTE ADULT - SUBJECTIVE AND OBJECTIVE BOX
HPI: 36 y/o F w/ PMH uterine septoplasty  38w pregnant (due for C/S 4/16) who presents to the hospital w/ 1w cough, RUQ/flank pain.    In the hospital she is HDS. LFT's wnl. Labs unremarkable. RUQ U/S equivocal for acute shane, but showing gallstones. Patient endorses she is eating well and is hungry for food. She denies hx consistent with biliary colic such as pain awaking her from sleep, or pain after meals. She maintains her most bothersome symptom is the cough which causes her to have more pain in the region.    PMH: As above    PSH: As above    Allergies: Erythromycin    Physical exam:    /56 HR 77 SpO2 99 T 97.9 RR 16    General- Older woman in mild distress  Abdomen- Positive Damian's sign. Gravid uterus  Lungs- Comfortable on room air    Imaging:     ACC: [id number] EXAM: US ABDOMEN COMPLETE ORDERED BY: OCTAVIANO CARBONE    PROCEDURE DATE: 04/15/2023        INTERPRETATION: CLINICAL INFORMATION: Right upper quadrant pain, 38 weeks pregnant.    COMPARISON: None available.    TECHNIQUE: Sonography of the abdomen.    FINDINGS:  Liver: Within normal limits.  Bile ducts: Normal caliber. Common bile duct measures 6 mm.  Gallbladder: Small gallstones.. Positive sonographic Damian's sign. No significant gallbladder wall thickening or pericholecystic fluid.  Pancreas: Poorly visualized.  Spleen: 11 cm. Within normal limits.  Right kidney: 10.8 cm. No hydronephrosis.  Left kidney: 11.5 cm. No hydronephrosis.  Ascites: None.  Aorta and IVC: Visualized portions are within normal limits.    IMPRESSION:  Cholelithiasis. Positive sonographic Damian sign. No gallbladder wall thickening or pericholecystic fluid. Findings are equivocal for acute cholecystitis.      --- End of Report ---          OSCAR DIEZ MD; Resident Radiologist  This document has been electronically signed.  DARIEL TAMEZ MD; Attending Radiologist  This document has been electronically signed. Apr 15 2023 6:28PM
GENERAL SURGERY CONSULT NOTE  Attending: Dr. Julio Cesar Petit  Team: [facility], [id number]     --------------------------------------------------------------------------------------------    HPI:  Subjective  HPI: 37yoF with PSHx of uterine repair that presented 2 days ago @38w6d gestational age presents with severe constant RUQ pain for the past 4 days. Describes pain as sharp and radiates to the back on the right side. Denies associated nausea but has had episodes of vomiting while coughing. Patient was evaluated for acute cholecystitis and imaging findings likely to be consistent with symptomatic cholelithiasis. Patient underwent  and delivery of baby, and is recovering well. A second opinion was requested by the patient to Dr. Petit for evaluation of the biliary colic symptoms and the need for possible lap shane.     At time of evaluation, patient was afebrile, hemodynamically stable and nontoxic appearing.        PAST MEDICAL & SURGICAL HISTORY:  Depression      Anxiety      Currently pregnant      Anemia      H/O hemorrhoids      H/O heartburn      H/O Helicobacter infection      2019 novel coronavirus disease (COVID-19)      Uterine septum      H/O dilation and curettage      Status post hysteroscopic resection of uterine septum      S/P colonoscopy with polypectomy      S/P tooth extraction        FAMILY HISTORY:  FHx: heart disease (Father)    Family hx of hypertension (Mother)    [] Family history not pertinent as reviewed with the patient and family    SOCIAL HISTORY:  n/a    ALLERGIES: Imitrex (Hives; Rash)  erythromycin (Unknown)      HOME MEDICATIONS: n/a    CURRENT MEDICATIONS  MEDICATIONS (STANDING): acetaminophen     Tablet .. 975 milliGRAM(s) Oral <User Schedule>  diphtheria/tetanus/pertussis (acellular) Vaccine (Adacel) 0.5 milliLiter(s) IntraMuscular once  escitalopram 30 milliGRAM(s) Oral daily  heparin   Injectable 5000 Unit(s) SubCutaneous every 12 hours  ibuprofen  Tablet. 600 milliGRAM(s) Oral every 6 hours  ibuprofen  Tablet. 600 milliGRAM(s) Oral every 6 hours  lactated ringers. 1000 milliLiter(s) IV Continuous <Continuous>  lactated ringers. 1000 milliLiter(s) IV Continuous <Continuous>    MEDICATIONS (PRN):dexAMETHasone  Injectable 4 milliGRAM(s) IV Push every 6 hours PRN Nausea  diphenhydrAMINE 25 milliGRAM(s) Oral every 6 hours PRN Pruritus  magnesium hydroxide Suspension 30 milliLiter(s) Oral two times a day PRN Constipation  nalbuphine Injectable 2.5 milliGRAM(s) IV Push every 6 hours PRN Pruritus  naloxone Injectable 0.1 milliGRAM(s) IV Push every 3 minutes PRN For ANY of the following changes in patient status:  A. Breaths Per Minute LESS THAN 10, B. Oxygen saturation LESS THAN 90%, C. Sedation score of 6 for Stop After: 4 Times  ondansetron Injectable 4 milliGRAM(s) IV Push every 6 hours PRN Nausea  oxyCODONE    IR 5 milliGRAM(s) Oral once PRN Moderate to Severe Pain (4-10)  oxyCODONE    IR 5 milliGRAM(s) Oral every 3 hours PRN Moderate to Severe Pain (4-10)  oxyCODONE    IR 5 milliGRAM(s) Oral every 3 hours PRN Moderate to Severe Pain (4-10)  simethicone 80 milliGRAM(s) Chew every 4 hours PRN Gas    --------------------------------------------------------------------------------------------    Vitals:   T(C): 36.5 (23 @ 17:18), Max: 36.8 (23 @ 08:57)  HR: 77 (23 @ 17:18) (72 - 77)  BP: 101/67 (23 @ 17:18) (90/58 - 103/64)  RR: 18 (23 @ 17:18) (18 - 18)  SpO2: 98% (23 @ 17:18) (96% - 98%)  CAPILLARY BLOOD GLUCOSE           @ 07:01  -   @ 07:00  --------------------------------------------------------  IN:    Lactated Ringers: 1000 mL  Total IN: 1000 mL    OUT:    Blood Loss (mL): 850 mL    Indwelling Catheter - Urethral (mL): 1250 mL    Voided (mL): 3 mL  Total OUT: 2103 mL    Total NET: -1103 mL       @ 07:01  -   @ 19:49  --------------------------------------------------------  IN:  Total IN: 0 mL    OUT:    Voided (mL): 575 mL  Total OUT: 575 mL    Total NET: -575 mL        Height (cm): 160 ( @ 17:15)  Weight (kg): 101.605 ( @ 17:15)  BMI (kg/m2): 39.7 ( 17:15)  BSA (m2): 2.03 (:15)    PHYSICAL EXAM:   General: NAD, Lying in bed comfortably  Neuro: A+Ox3  HEENT: NC/AT, EOMI  Neck: Soft, supple  Cardio: RRR, nml S1/S2  Resp: Good effort, CTA b/l  GI/Abd: Soft, NT/ND, no rebound/guarding, no masses palpated. appropriately tender from a C-sec   Vascular: All 4 extremities warm.  --------------------------------------------------------------------------------------------    LABS  CBC ( @ 06:28)                              10.1<L>                         14.31<H>  )----------------(  150        78.4<H>% Neutrophils, 14.0  % Lymphocytes, ANC: 11.22<H>                              31.0<L>    BMP ( @ 06:38)             137     |  105     |  9     		Ca++ --      Ca 8.5                ---------------------------------( 93    		Mg --                 4.5     |  24      |  0.68  			Ph --        LFTs ( @ 06:38)      TPro 4.9<L> / Alb 2.6<L> / TBili 0.2 / DBili -- / AST 36 / ALT 66<H> / AlkPhos 87            --------------------------------------------------------------------------------------------    MICROBIOLOGY  Urinalysis (04-15 @ 12:34):     Color: Yellow / Appearance: Clear / S.022 / pH: 6.5 / Gluc: Negative / Ketones: Negative / Bili: Negative / Urobili: Negative / Protein :Trace<!> / Nitrites: Negative / Leuk.Est: Negative / RBC: 6<H> / WBC: 2 / Sq Epi:  / Non Sq Epi:  / Bacteria Negative         --------------------------------------------------------------------------------------------    IMAGING  < from: US Abdomen Complete (US Abdomen Complete .) (04.15.23 @ 17:44) >  FINDINGS:  Liver: Within normal limits.  Bile ducts: Normal caliber. Common bile duct measures 6 mm.  Gallbladder: Small gallstones.. Positive sonographic Damian's sign. No   significant gallbladder wall thickening or pericholecystic fluid.  Pancreas: Poorly visualized.  Spleen: 11 cm. Within normal limits.  Right kidney: 10.8 cm. No hydronephrosis.  Left kidney: 11.5 cm. No hydronephrosis.  Ascites: None.  Aorta and IVC: Visualized portions are within normal limits.    IMPRESSION:  Cholelithiasis. Positive sonographic Damian sign. No gallbladder wall   thickening or pericholecystic fluid. Findings are equivocal for acute   cholecystitis.      --- End of Report ---          OSCAR DIEZ MD; Resident Radiologist  This document has been electronically signed.  DARIEL TAMEZ MD; Attending Radiologist  This document has been electronically signed. Apr 15 2023  6:28PM    < end of copied text >      --------------------------------------------------------------------------------------------

## 2023-04-17 NOTE — PROGRESS NOTE ADULT - ATTENDING COMMENTS
Pt seen and examined with ACS team on 4/17. Extensive discussion with patient, her wife, her mother and mother-in-law regarding whether she should have her gallbladder removed and when. We discussed that biliary colic is likely to recur in the future, although it may not, so we recommend gallbladder removal when gallstones become symptomatic. We discussed risks of the surgery, as well as expected recovery, activity limitations, diet recommendations, and that if she'd like to have surgery, options include having it during this admission (advantage - no risk of recurrence while awaiting surgery, one recovery period from C/S and cholecystectomy) or at a later date (advantage - not having two surgeries so close together). Questions answered, pt asked for time to discuss with her family.

## 2023-04-17 NOTE — PROGRESS NOTE ADULT - ATTENDING COMMENTS
Patient seen and examined, agree with above. Her RUQ is significantly better. Incisional pain controlled as well. Tolerating PO.     Gen: AAOx3, NAD  Abd: Soft, appropriately tender near incision, fundus firm, incision C/D/I with steri strips   Ext: +1 pedal edema bilaterally, NT    Discussed that vitals stable, WBC appropriate for POD#1, and ALT downtrending. Gen Surg to see patient today. c/w in patient care.

## 2023-04-17 NOTE — PROVIDER CONTACT NOTE (OTHER) - ACTION/TREATMENT ORDERED:
dr monte called and made aware.  pt ordered for fluid bolus 1 liter and encouraged to increase oral fluid intake.  will continue to monitor urine output.  pt's safety maintained, call light in reach

## 2023-04-17 NOTE — CONSULT NOTE ADULT - ATTENDING COMMENTS
Date of service 23    Called by family for second opinion regarding timing of cholecystectomy. Briefly, this is a 36 yo F who is post partum day 1 after . She initially developed RUQ pain ~4d prior and was sent in for delivery. At that time her labs were grossly unremarkable, she had a mild increase in her LFTs. Abdominal US was equivocal for cholecystitis, she had a shine's sign but no other findings.  I had a long discussion with the patient and the family regarding timing for cholecystectomy. She has been seen and evaluated by the ACS service who are planning cholecystectomy prior to discharge. I have offered her a period of approximately 4-6 weeks to wait post partum to schedule her cholecystectomy to allow time to heal and recover from physiologic changes of pregnancy. I have explained that post cholecystectomy she will not be able to lift more than 5-10 lbs due to the risk of a hernia.   The patient and her family asked many questions including the likelihood of conversion to an open procedure as well as general risks of surgery and all of her questions were answered.  Ultimately they were concerned about the additional lifting restrictions in addition to her current restrictions s/p her .  After a long discussion, they opted to proceed with laparoscopic cholecystectomy with the ACS team prior to discharge.  Thank you for involving us in this patient's care
seen and examined 04-15-23 @ 2345    2019 @ [facility] - hysteroscopy for septate uterus   @ [facility] - hysteroscopy / laparoscopic for septate uterus    38w6d pregnancy  previously schedule for  on , now planning Sun  for [facility].    RUQ pain radiating to back since     soft / mild RUQ tenderness / ND  3rd trimester gravid uterus  no jaundice    WBC = 9  LFTs - relatively wnl    U/S abd - cholelithiasis without gallbladder wall thickening or pericholecystic fluid. positive sonographic Damian's sign. CBD = 5 mm.      cholelithiasis with concern for acute cholecystitis  -will reevaluate her after the   -If her symptoms persist, she will likely need laparoscopic cholecystectomy prior to discharge home.      I reviewed her labs and radiologic images.  care coordinated with OB team.  plan discussed with her wife at bedside in OB triage area.

## 2023-04-18 ENCOUNTER — TRANSCRIPTION ENCOUNTER (OUTPATIENT)
Age: 38
End: 2023-04-18

## 2023-04-18 LAB
ANION GAP SERPL CALC-SCNC: 10 MMOL/L — SIGNIFICANT CHANGE UP (ref 5–17)
APTT BLD: 26.8 SEC — LOW (ref 27.5–35.5)
BLD GP AB SCN SERPL QL: NEGATIVE — SIGNIFICANT CHANGE UP
BUN SERPL-MCNC: 6 MG/DL — LOW (ref 7–23)
CALCIUM SERPL-MCNC: 8.1 MG/DL — LOW (ref 8.4–10.5)
CHLORIDE SERPL-SCNC: 108 MMOL/L — SIGNIFICANT CHANGE UP (ref 96–108)
CO2 SERPL-SCNC: 21 MMOL/L — LOW (ref 22–31)
CREAT SERPL-MCNC: 0.64 MG/DL — SIGNIFICANT CHANGE UP (ref 0.5–1.3)
EGFR: 117 ML/MIN/1.73M2 — SIGNIFICANT CHANGE UP
GLUCOSE SERPL-MCNC: 98 MG/DL — SIGNIFICANT CHANGE UP (ref 70–99)
HCG SERPL-ACNC: 1065 MIU/ML — HIGH
HCT VFR BLD CALC: 29.8 % — LOW (ref 34.5–45)
HGB BLD-MCNC: 9.5 G/DL — LOW (ref 11.5–15.5)
INR BLD: 0.86 RATIO — LOW (ref 0.88–1.16)
MAGNESIUM SERPL-MCNC: 1.8 MG/DL — SIGNIFICANT CHANGE UP (ref 1.6–2.6)
MCHC RBC-ENTMCNC: 28.5 PG — SIGNIFICANT CHANGE UP (ref 27–34)
MCHC RBC-ENTMCNC: 31.9 GM/DL — LOW (ref 32–36)
MCV RBC AUTO: 89.5 FL — SIGNIFICANT CHANGE UP (ref 80–100)
NRBC # BLD: 0 /100 WBCS — SIGNIFICANT CHANGE UP (ref 0–0)
PHOSPHATE SERPL-MCNC: 3.2 MG/DL — SIGNIFICANT CHANGE UP (ref 2.5–4.5)
PLATELET # BLD AUTO: 155 K/UL — SIGNIFICANT CHANGE UP (ref 150–400)
POTASSIUM SERPL-MCNC: 3.9 MMOL/L — SIGNIFICANT CHANGE UP (ref 3.5–5.3)
POTASSIUM SERPL-SCNC: 3.9 MMOL/L — SIGNIFICANT CHANGE UP (ref 3.5–5.3)
PROTHROM AB SERPL-ACNC: 9.9 SEC — LOW (ref 10.5–13.4)
RBC # BLD: 3.33 M/UL — LOW (ref 3.8–5.2)
RBC # FLD: 17.3 % — HIGH (ref 10.3–14.5)
RH IG SCN BLD-IMP: POSITIVE — SIGNIFICANT CHANGE UP
SODIUM SERPL-SCNC: 139 MMOL/L — SIGNIFICANT CHANGE UP (ref 135–145)
WBC # BLD: 11.86 K/UL — HIGH (ref 3.8–10.5)
WBC # FLD AUTO: 11.86 K/UL — HIGH (ref 3.8–10.5)

## 2023-04-18 PROCEDURE — 88304 TISSUE EXAM BY PATHOLOGIST: CPT | Mod: 26

## 2023-04-18 PROCEDURE — 47562 LAPAROSCOPIC CHOLECYSTECTOMY: CPT

## 2023-04-18 DEVICE — LIGATING CLIPS WECK HEMOLOK POLYMER MEDIUM-LARGE (GREEN) 6: Type: IMPLANTABLE DEVICE | Status: FUNCTIONAL

## 2023-04-18 RX ORDER — DIPHENHYDRAMINE HCL 50 MG
25 CAPSULE ORAL EVERY 6 HOURS
Refills: 0 | Status: DISCONTINUED | OUTPATIENT
Start: 2023-04-18 | End: 2023-04-20

## 2023-04-18 RX ORDER — ONDANSETRON 8 MG/1
4 TABLET, FILM COATED ORAL ONCE
Refills: 0 | Status: DISCONTINUED | OUTPATIENT
Start: 2023-04-18 | End: 2023-04-18

## 2023-04-18 RX ORDER — IBUPROFEN 200 MG
600 TABLET ORAL EVERY 6 HOURS
Refills: 0 | Status: DISCONTINUED | OUTPATIENT
Start: 2023-04-18 | End: 2023-04-20

## 2023-04-18 RX ORDER — HEPARIN SODIUM 5000 [USP'U]/ML
5000 INJECTION INTRAVENOUS; SUBCUTANEOUS EVERY 8 HOURS
Refills: 0 | Status: DISCONTINUED | OUTPATIENT
Start: 2023-04-18 | End: 2023-04-20

## 2023-04-18 RX ORDER — SIMETHICONE 80 MG/1
80 TABLET, CHEWABLE ORAL EVERY 4 HOURS
Refills: 0 | Status: DISCONTINUED | OUTPATIENT
Start: 2023-04-18 | End: 2023-04-20

## 2023-04-18 RX ORDER — OXYCODONE HYDROCHLORIDE 5 MG/1
2.5 TABLET ORAL EVERY 4 HOURS
Refills: 0 | Status: DISCONTINUED | OUTPATIENT
Start: 2023-04-18 | End: 2023-04-20

## 2023-04-18 RX ORDER — OXYCODONE HYDROCHLORIDE 5 MG/1
5 TABLET ORAL
Refills: 0 | Status: DISCONTINUED | OUTPATIENT
Start: 2023-04-18 | End: 2023-04-20

## 2023-04-18 RX ORDER — MAGNESIUM HYDROXIDE 400 MG/1
30 TABLET, CHEWABLE ORAL
Refills: 0 | Status: DISCONTINUED | OUTPATIENT
Start: 2023-04-18 | End: 2023-04-20

## 2023-04-18 RX ORDER — HYDROMORPHONE HYDROCHLORIDE 2 MG/ML
0.5 INJECTION INTRAMUSCULAR; INTRAVENOUS; SUBCUTANEOUS
Refills: 0 | Status: DISCONTINUED | OUTPATIENT
Start: 2023-04-18 | End: 2023-04-18

## 2023-04-18 RX ORDER — LANOLIN
1 OINTMENT (GRAM) TOPICAL EVERY 6 HOURS
Refills: 0 | Status: DISCONTINUED | OUTPATIENT
Start: 2023-04-18 | End: 2023-04-20

## 2023-04-18 RX ORDER — ACETAMINOPHEN 500 MG
1000 TABLET ORAL EVERY 6 HOURS
Refills: 0 | Status: COMPLETED | OUTPATIENT
Start: 2023-04-18 | End: 2023-04-19

## 2023-04-18 RX ORDER — SODIUM CHLORIDE 9 MG/ML
1000 INJECTION, SOLUTION INTRAVENOUS
Refills: 0 | Status: DISCONTINUED | OUTPATIENT
Start: 2023-04-18 | End: 2023-04-20

## 2023-04-18 RX ORDER — OXYCODONE HYDROCHLORIDE 5 MG/1
5 TABLET ORAL EVERY 4 HOURS
Refills: 0 | Status: DISCONTINUED | OUTPATIENT
Start: 2023-04-18 | End: 2023-04-20

## 2023-04-18 RX ORDER — ESCITALOPRAM OXALATE 10 MG/1
30 TABLET, FILM COATED ORAL DAILY
Refills: 0 | Status: DISCONTINUED | OUTPATIENT
Start: 2023-04-18 | End: 2023-04-20

## 2023-04-18 RX ORDER — IBUPROFEN 200 MG
600 TABLET ORAL EVERY 6 HOURS
Refills: 0 | Status: COMPLETED | OUTPATIENT
Start: 2023-04-18 | End: 2024-03-16

## 2023-04-18 RX ORDER — TETANUS TOXOID, REDUCED DIPHTHERIA TOXOID AND ACELLULAR PERTUSSIS VACCINE, ADSORBED 5; 2.5; 8; 8; 2.5 [IU]/.5ML; [IU]/.5ML; UG/.5ML; UG/.5ML; UG/.5ML
0.5 SUSPENSION INTRAMUSCULAR ONCE
Refills: 0 | Status: DISCONTINUED | OUTPATIENT
Start: 2023-04-18 | End: 2023-04-20

## 2023-04-18 RX ORDER — OXYTOCIN 10 UNIT/ML
333.33 VIAL (ML) INJECTION
Qty: 20 | Refills: 0 | Status: DISCONTINUED | OUTPATIENT
Start: 2023-04-18 | End: 2023-04-20

## 2023-04-18 RX ADMIN — Medication 1000 MILLIGRAM(S): at 21:45

## 2023-04-18 RX ADMIN — OXYCODONE HYDROCHLORIDE 5 MILLIGRAM(S): 5 TABLET ORAL at 06:01

## 2023-04-18 RX ADMIN — Medication 975 MILLIGRAM(S): at 10:00

## 2023-04-18 RX ADMIN — Medication 600 MILLIGRAM(S): at 01:10

## 2023-04-18 RX ADMIN — Medication 400 MILLIGRAM(S): at 20:46

## 2023-04-18 RX ADMIN — Medication 600 MILLIGRAM(S): at 00:17

## 2023-04-18 RX ADMIN — ESCITALOPRAM OXALATE 30 MILLIGRAM(S): 10 TABLET, FILM COATED ORAL at 09:15

## 2023-04-18 RX ADMIN — OXYCODONE HYDROCHLORIDE 5 MILLIGRAM(S): 5 TABLET ORAL at 20:45

## 2023-04-18 RX ADMIN — Medication 600 MILLIGRAM(S): at 06:01

## 2023-04-18 RX ADMIN — OXYCODONE HYDROCHLORIDE 5 MILLIGRAM(S): 5 TABLET ORAL at 21:45

## 2023-04-18 RX ADMIN — SODIUM CHLORIDE 75 MILLILITER(S): 9 INJECTION, SOLUTION INTRAVENOUS at 16:30

## 2023-04-18 RX ADMIN — ONDANSETRON 4 MILLIGRAM(S): 8 TABLET, FILM COATED ORAL at 16:18

## 2023-04-18 RX ADMIN — Medication 975 MILLIGRAM(S): at 09:16

## 2023-04-18 RX ADMIN — OXYCODONE HYDROCHLORIDE 5 MILLIGRAM(S): 5 TABLET ORAL at 05:02

## 2023-04-18 NOTE — BRIEF OPERATIVE NOTE - OPERATION/FINDINGS
Entry into abdomen via Arianna technique. Port sites placed under direct visualization. 30cc of bile suctioned off the gallbladder and gallbladder decompressed. critical view of safety obtained. cystic artery and cystic duct identified, clipped with hemolocks and sharply divided. gallbladder dissected off liver bed with cautery. minimal bile spillage from gallbladder. gallbladder removed via endo catch bag. abdomen thoroughly irrigated. fascia closed with 0-vicryl. port sites closed with 4-0 monocryl.

## 2023-04-18 NOTE — PROGRESS NOTE ADULT - ATTENDING COMMENTS
I personally saw and evaluated patient and agree with Dr Redmond's assessment and plan.  Pt is recovering well from her primary c/s on POD 2.    Her incision is c/d/i   RUQ discomfort has improved.  ALT also normalizing. Pt was seen by surgery team and is scheduled for lap shane today with Dr Garcia.  Continue routine PO care.    ANDRADE Davison

## 2023-04-18 NOTE — CHART NOTE - NSCHARTNOTEFT_GEN_A_CORE
Surgery Post op Note    Procedure: Laparoscopic Cholecystectomy    Subjective: Patient seen and evaluated at the bedside. Endorsing abdominal pain with movement. No nausea, no vomiting or any other new symptom. All questions addressed.       Objective:   Vital Signs Last 24 Hrs  T(C): 36.4 (18 Apr 2023 17:30), Max: 36.9 (18 Apr 2023 06:04)  T(F): 97.5 (18 Apr 2023 17:30), Max: 98.4 (18 Apr 2023 06:04)  HR: 59 (18 Apr 2023 17:30) (59 - 82)  BP: 97/65 (18 Apr 2023 17:30) (95/52 - 109/71)  BP(mean): 74 (18 Apr 2023 17:00) (67 - 84)  RR: 18 (18 Apr 2023 17:30) (16 - 20)  SpO2: 97% (18 Apr 2023 17:30) (94% - 100%)    Parameters below as of 18 Apr 2023 17:30  Patient On (Oxygen Delivery Method): room air      I&O's Summary    17 Apr 2023 07:01  -  18 Apr 2023 07:00  --------------------------------------------------------  IN: 0 mL / OUT: 575 mL / NET: -575 mL    18 Apr 2023 07:01  -  18 Apr 2023 20:30  --------------------------------------------------------  IN: 150 mL / OUT: 0 mL / NET: 150 mL      I&O's Detail    17 Apr 2023 07:01  -  18 Apr 2023 07:00  --------------------------------------------------------  IN:  Total IN: 0 mL    OUT:    Voided (mL): 575 mL  Total OUT: 575 mL    Total NET: -575 mL      18 Apr 2023 07:01  -  18 Apr 2023 20:30  --------------------------------------------------------  IN:    Lactated Ringers: 150 mL  Total IN: 150 mL    OUT:  Total OUT: 0 mL    Total NET: 150 mL      Labs:                        9.5    11.86 )-----------( 155      ( 18 Apr 2023 06:47 )             29.8     04-18    139  |  108  |  6<L>  ----------------------------<  98  3.9   |  21<L>  |  0.64    Ca    8.1<L>      18 Apr 2023 06:47  Phos  3.2     04-18  Mg     1.8     04-18    TPro  4.9<L>  /  Alb  2.6<L>  /  TBili  0.2  /  DBili  x   /  AST  36  /  ALT  66<H>  /  AlkPhos  87  04-17    PT/INR - ( 18 Apr 2023 06:47 )   PT: 9.9 sec;   INR: 0.86 ratio         PTT - ( 18 Apr 2023 06:47 )  PTT:26.8 sec      MEDICATIONS  (STANDING):  acetaminophen   IVPB .. 1000 milliGRAM(s) IV Intermittent every 6 hours  diphtheria/tetanus/pertussis (acellular) Vaccine (Adacel) 0.5 milliLiter(s) IntraMuscular once  heparin   Injectable 5000 Unit(s) SubCutaneous every 8 hours  ibuprofen  Tablet. 600 milliGRAM(s) Oral every 6 hours  lactated ringers. 1000 milliLiter(s) (75 mL/Hr) IV Continuous <Continuous>  oxytocin Infusion 333.333 milliUNIT(s)/Min (1000 mL/Hr) IV Continuous <Continuous>    MEDICATIONS  (PRN):  dexAMETHasone  Injectable 4 milliGRAM(s) IV Push every 6 hours PRN Nausea  diphenhydrAMINE 25 milliGRAM(s) Oral every 6 hours PRN Pruritus  lanolin Ointment 1 Application(s) Topical every 6 hours PRN Sore Nipples  magnesium hydroxide Suspension 30 milliLiter(s) Oral two times a day PRN Constipation  nalbuphine Injectable 2.5 milliGRAM(s) IV Push every 6 hours PRN Pruritus  oxyCODONE    IR 5 milliGRAM(s) Oral every 3 hours PRN Moderate to Severe Pain (4-10)  oxyCODONE    IR 5 milliGRAM(s) Oral every 4 hours PRN Severe Pain (7 - 10)  oxyCODONE    IR 2.5 milliGRAM(s) Oral every 4 hours PRN Moderate Pain (4 - 6)  simethicone 80 milliGRAM(s) Chew every 4 hours PRN Gas      Physical Exam:  General: well developed, well nourished, NAD  HEENT: ncat, trachea midline  Respiratory: respirations non labored  Gastrointestinal: nondistended, nontender, appropriately tender in RUQ and above Pfannenstiel incision, binder in place  Extremities: FROM, warm, move spontaneously x4      Assessment/Plan: 37y Female  s/p laparascopic cholecystectomy 4/18  - Diet: low fat diet, advance to regular as tolerated  - Activity- OOB with assistance  - Labs: am labs  - Pain medication as needed   - DVT ppx  - care/dispo per OBGYN    Trauma Surgery, [phone]

## 2023-04-18 NOTE — PRE-ANESTHESIA EVALUATION ADULT - NSANTHOSAYNRD_GEN_A_CORE
No. WOLF screening performed.  STOP BANG Legend: 0-2 = LOW Risk; 3-4 = INTERMEDIATE Risk; 5-8 = HIGH Risk
No. WOLF screening performed.  STOP BANG Legend: 0-2 = LOW Risk; 3-4 = INTERMEDIATE Risk; 5-8 = HIGH Risk

## 2023-04-19 LAB
ALBUMIN SERPL ELPH-MCNC: 2.7 G/DL — LOW (ref 3.3–5)
ALP SERPL-CCNC: 74 U/L — SIGNIFICANT CHANGE UP (ref 40–120)
ALT FLD-CCNC: 55 U/L — HIGH (ref 10–45)
ANION GAP SERPL CALC-SCNC: 9 MMOL/L — SIGNIFICANT CHANGE UP (ref 5–17)
AST SERPL-CCNC: 33 U/L — SIGNIFICANT CHANGE UP (ref 10–40)
BILIRUB DIRECT SERPL-MCNC: <0.1 MG/DL — SIGNIFICANT CHANGE UP (ref 0–0.3)
BILIRUB INDIRECT FLD-MCNC: >0 MG/DL — LOW (ref 0.2–1)
BILIRUB SERPL-MCNC: 0.1 MG/DL — LOW (ref 0.2–1.2)
BUN SERPL-MCNC: 8 MG/DL — SIGNIFICANT CHANGE UP (ref 7–23)
CALCIUM SERPL-MCNC: 7.5 MG/DL — LOW (ref 8.4–10.5)
CHLORIDE SERPL-SCNC: 105 MMOL/L — SIGNIFICANT CHANGE UP (ref 96–108)
CO2 SERPL-SCNC: 22 MMOL/L — SIGNIFICANT CHANGE UP (ref 22–31)
CREAT SERPL-MCNC: 0.67 MG/DL — SIGNIFICANT CHANGE UP (ref 0.5–1.3)
EGFR: 115 ML/MIN/1.73M2 — SIGNIFICANT CHANGE UP
GLUCOSE SERPL-MCNC: 97 MG/DL — SIGNIFICANT CHANGE UP (ref 70–99)
HCT VFR BLD CALC: 28.2 % — LOW (ref 34.5–45)
HGB BLD-MCNC: 9 G/DL — LOW (ref 11.5–15.5)
MAGNESIUM SERPL-MCNC: 1.9 MG/DL — SIGNIFICANT CHANGE UP (ref 1.6–2.6)
MCHC RBC-ENTMCNC: 28.4 PG — SIGNIFICANT CHANGE UP (ref 27–34)
MCHC RBC-ENTMCNC: 31.9 GM/DL — LOW (ref 32–36)
MCV RBC AUTO: 89 FL — SIGNIFICANT CHANGE UP (ref 80–100)
NRBC # BLD: 0 /100 WBCS — SIGNIFICANT CHANGE UP (ref 0–0)
PHOSPHATE SERPL-MCNC: 3.4 MG/DL — SIGNIFICANT CHANGE UP (ref 2.5–4.5)
PLATELET # BLD AUTO: 175 K/UL — SIGNIFICANT CHANGE UP (ref 150–400)
POTASSIUM SERPL-MCNC: 4.2 MMOL/L — SIGNIFICANT CHANGE UP (ref 3.5–5.3)
POTASSIUM SERPL-SCNC: 4.2 MMOL/L — SIGNIFICANT CHANGE UP (ref 3.5–5.3)
PROT SERPL-MCNC: 5 G/DL — LOW (ref 6–8.3)
RBC # BLD: 3.17 M/UL — LOW (ref 3.8–5.2)
RBC # FLD: 17.5 % — HIGH (ref 10.3–14.5)
SODIUM SERPL-SCNC: 136 MMOL/L — SIGNIFICANT CHANGE UP (ref 135–145)
WBC # BLD: 12.58 K/UL — HIGH (ref 3.8–10.5)
WBC # FLD AUTO: 12.58 K/UL — HIGH (ref 3.8–10.5)

## 2023-04-19 RX ORDER — OXYCODONE HYDROCHLORIDE 5 MG/1
1 TABLET ORAL
Qty: 0 | Refills: 0 | DISCHARGE
Start: 2023-04-19

## 2023-04-19 RX ORDER — ACETAMINOPHEN 500 MG
975 TABLET ORAL EVERY 6 HOURS
Refills: 0 | Status: DISCONTINUED | OUTPATIENT
Start: 2023-04-19 | End: 2023-04-20

## 2023-04-19 RX ORDER — SENNA PLUS 8.6 MG/1
2 TABLET ORAL DAILY
Refills: 0 | Status: DISCONTINUED | OUTPATIENT
Start: 2023-04-19 | End: 2023-04-20

## 2023-04-19 RX ORDER — IBUPROFEN 200 MG
1 TABLET ORAL
Qty: 0 | Refills: 0 | DISCHARGE
Start: 2023-04-19

## 2023-04-19 RX ORDER — IBUPROFEN 200 MG
600 TABLET ORAL EVERY 6 HOURS
Refills: 0 | Status: DISCONTINUED | OUTPATIENT
Start: 2023-04-19 | End: 2023-04-20

## 2023-04-19 RX ADMIN — OXYCODONE HYDROCHLORIDE 5 MILLIGRAM(S): 5 TABLET ORAL at 20:36

## 2023-04-19 RX ADMIN — Medication 600 MILLIGRAM(S): at 23:34

## 2023-04-19 RX ADMIN — HEPARIN SODIUM 5000 UNIT(S): 5000 INJECTION INTRAVENOUS; SUBCUTANEOUS at 15:06

## 2023-04-19 RX ADMIN — SIMETHICONE 80 MILLIGRAM(S): 80 TABLET, CHEWABLE ORAL at 08:39

## 2023-04-19 RX ADMIN — Medication 600 MILLIGRAM(S): at 05:07

## 2023-04-19 RX ADMIN — Medication 600 MILLIGRAM(S): at 18:35

## 2023-04-19 RX ADMIN — Medication 975 MILLIGRAM(S): at 21:20

## 2023-04-19 RX ADMIN — Medication 600 MILLIGRAM(S): at 00:33

## 2023-04-19 RX ADMIN — ESCITALOPRAM OXALATE 30 MILLIGRAM(S): 10 TABLET, FILM COATED ORAL at 11:45

## 2023-04-19 RX ADMIN — Medication 600 MILLIGRAM(S): at 12:39

## 2023-04-19 RX ADMIN — Medication 1000 MILLIGRAM(S): at 16:00

## 2023-04-19 RX ADMIN — OXYCODONE HYDROCHLORIDE 5 MILLIGRAM(S): 5 TABLET ORAL at 08:39

## 2023-04-19 RX ADMIN — HEPARIN SODIUM 5000 UNIT(S): 5000 INJECTION INTRAVENOUS; SUBCUTANEOUS at 08:38

## 2023-04-19 RX ADMIN — Medication 975 MILLIGRAM(S): at 20:36

## 2023-04-19 RX ADMIN — OXYCODONE HYDROCHLORIDE 5 MILLIGRAM(S): 5 TABLET ORAL at 05:07

## 2023-04-19 RX ADMIN — Medication 600 MILLIGRAM(S): at 11:45

## 2023-04-19 RX ADMIN — Medication 1000 MILLIGRAM(S): at 09:50

## 2023-04-19 RX ADMIN — Medication 400 MILLIGRAM(S): at 08:39

## 2023-04-19 RX ADMIN — OXYCODONE HYDROCHLORIDE 5 MILLIGRAM(S): 5 TABLET ORAL at 21:20

## 2023-04-19 RX ADMIN — SIMETHICONE 80 MILLIGRAM(S): 80 TABLET, CHEWABLE ORAL at 20:37

## 2023-04-19 RX ADMIN — Medication 600 MILLIGRAM(S): at 06:07

## 2023-04-19 RX ADMIN — HEPARIN SODIUM 5000 UNIT(S): 5000 INJECTION INTRAVENOUS; SUBCUTANEOUS at 00:33

## 2023-04-19 RX ADMIN — Medication 600 MILLIGRAM(S): at 01:33

## 2023-04-19 RX ADMIN — Medication 400 MILLIGRAM(S): at 15:01

## 2023-04-19 RX ADMIN — SENNA PLUS 2 TABLET(S): 8.6 TABLET ORAL at 20:37

## 2023-04-19 RX ADMIN — HEPARIN SODIUM 5000 UNIT(S): 5000 INJECTION INTRAVENOUS; SUBCUTANEOUS at 23:35

## 2023-04-19 RX ADMIN — OXYCODONE HYDROCHLORIDE 5 MILLIGRAM(S): 5 TABLET ORAL at 09:50

## 2023-04-19 RX ADMIN — Medication 600 MILLIGRAM(S): at 17:28

## 2023-04-19 RX ADMIN — Medication 400 MILLIGRAM(S): at 02:49

## 2023-04-19 RX ADMIN — OXYCODONE HYDROCHLORIDE 5 MILLIGRAM(S): 5 TABLET ORAL at 01:33

## 2023-04-19 RX ADMIN — Medication 1000 MILLIGRAM(S): at 03:20

## 2023-04-19 RX ADMIN — OXYCODONE HYDROCHLORIDE 5 MILLIGRAM(S): 5 TABLET ORAL at 06:07

## 2023-04-19 RX ADMIN — OXYCODONE HYDROCHLORIDE 5 MILLIGRAM(S): 5 TABLET ORAL at 00:33

## 2023-04-20 ENCOUNTER — APPOINTMENT (OUTPATIENT)
Dept: ANTEPARTUM | Facility: CLINIC | Age: 38
End: 2023-04-20

## 2023-04-20 VITALS
SYSTOLIC BLOOD PRESSURE: 106 MMHG | TEMPERATURE: 98 F | HEART RATE: 62 BPM | DIASTOLIC BLOOD PRESSURE: 73 MMHG | OXYGEN SATURATION: 97 % | RESPIRATION RATE: 18 BRPM

## 2023-04-20 PROCEDURE — 81001 URINALYSIS AUTO W/SCOPE: CPT

## 2023-04-20 PROCEDURE — 76700 US EXAM ABDOM COMPLETE: CPT

## 2023-04-20 PROCEDURE — 84702 CHORIONIC GONADOTROPIN TEST: CPT

## 2023-04-20 PROCEDURE — 86901 BLOOD TYPING SEROLOGIC RH(D): CPT

## 2023-04-20 PROCEDURE — 86769 SARS-COV-2 COVID-19 ANTIBODY: CPT

## 2023-04-20 PROCEDURE — 59025 FETAL NON-STRESS TEST: CPT

## 2023-04-20 PROCEDURE — 80053 COMPREHEN METABOLIC PANEL: CPT

## 2023-04-20 PROCEDURE — 86850 RBC ANTIBODY SCREEN: CPT

## 2023-04-20 PROCEDURE — 88304 TISSUE EXAM BY PATHOLOGIST: CPT

## 2023-04-20 PROCEDURE — 82570 ASSAY OF URINE CREATININE: CPT

## 2023-04-20 PROCEDURE — C9399: CPT

## 2023-04-20 PROCEDURE — 85610 PROTHROMBIN TIME: CPT

## 2023-04-20 PROCEDURE — 85730 THROMBOPLASTIN TIME PARTIAL: CPT

## 2023-04-20 PROCEDURE — 36415 COLL VENOUS BLD VENIPUNCTURE: CPT

## 2023-04-20 PROCEDURE — 80076 HEPATIC FUNCTION PANEL: CPT

## 2023-04-20 PROCEDURE — 86780 TREPONEMA PALLIDUM: CPT

## 2023-04-20 PROCEDURE — 86900 BLOOD TYPING SEROLOGIC ABO: CPT

## 2023-04-20 PROCEDURE — 84156 ASSAY OF PROTEIN URINE: CPT

## 2023-04-20 PROCEDURE — 83735 ASSAY OF MAGNESIUM: CPT

## 2023-04-20 PROCEDURE — 59050 FETAL MONITOR W/REPORT: CPT

## 2023-04-20 PROCEDURE — C1889: CPT

## 2023-04-20 PROCEDURE — 85384 FIBRINOGEN ACTIVITY: CPT

## 2023-04-20 PROCEDURE — 83615 LACTATE (LD) (LDH) ENZYME: CPT

## 2023-04-20 PROCEDURE — 80048 BASIC METABOLIC PNL TOTAL CA: CPT

## 2023-04-20 PROCEDURE — 83690 ASSAY OF LIPASE: CPT

## 2023-04-20 PROCEDURE — 84100 ASSAY OF PHOSPHORUS: CPT

## 2023-04-20 PROCEDURE — 85025 COMPLETE CBC W/AUTO DIFF WBC: CPT

## 2023-04-20 PROCEDURE — 82150 ASSAY OF AMYLASE: CPT

## 2023-04-20 PROCEDURE — 84550 ASSAY OF BLOOD/URIC ACID: CPT

## 2023-04-20 PROCEDURE — 85027 COMPLETE CBC AUTOMATED: CPT

## 2023-04-20 RX ORDER — OXYCODONE HYDROCHLORIDE 5 MG/1
1 TABLET ORAL
Qty: 10 | Refills: 0
Start: 2023-04-20 | End: 2023-04-22

## 2023-04-20 RX ADMIN — Medication 975 MILLIGRAM(S): at 04:15

## 2023-04-20 RX ADMIN — Medication 975 MILLIGRAM(S): at 03:22

## 2023-04-20 RX ADMIN — SIMETHICONE 80 MILLIGRAM(S): 80 TABLET, CHEWABLE ORAL at 00:27

## 2023-04-20 RX ADMIN — Medication 600 MILLIGRAM(S): at 12:37

## 2023-04-20 RX ADMIN — Medication 600 MILLIGRAM(S): at 00:22

## 2023-04-20 RX ADMIN — OXYCODONE HYDROCHLORIDE 5 MILLIGRAM(S): 5 TABLET ORAL at 00:30

## 2023-04-20 RX ADMIN — OXYCODONE HYDROCHLORIDE 5 MILLIGRAM(S): 5 TABLET ORAL at 00:27

## 2023-04-20 RX ADMIN — Medication 975 MILLIGRAM(S): at 09:33

## 2023-04-20 RX ADMIN — Medication 600 MILLIGRAM(S): at 12:07

## 2023-04-20 RX ADMIN — ESCITALOPRAM OXALATE 30 MILLIGRAM(S): 10 TABLET, FILM COATED ORAL at 12:07

## 2023-04-20 RX ADMIN — Medication 600 MILLIGRAM(S): at 05:26

## 2023-04-20 RX ADMIN — Medication 975 MILLIGRAM(S): at 08:33

## 2023-04-20 RX ADMIN — HEPARIN SODIUM 5000 UNIT(S): 5000 INJECTION INTRAVENOUS; SUBCUTANEOUS at 08:32

## 2023-04-20 NOTE — PROGRESS NOTE ADULT - ASSESSMENT
A/P: 37y POD#1 s/p elective pLTCS in the setting of initially presenting for RUQ pain c/f cholecystitis. Patient is currently stable     #Post-partum   - Continue regular diet  - Encourage ambulation as tolerated   - Continue Ibuprofen, Acetaminophen, and/or Oxycodone PRN for pain control  - Due to void after veliz removal  - F/u AM CBC (12.7/39.2->10.1/31)    #Loose stool  - Will continue to monitor    #RUQ pain   - Will follow-up any further general surgery recommendations  - Prior evaluation recommended potential lsc shane is sx persist     Fernando Redmond, PGY-1  Ob/Gyn
A/P: 37y POD#2 s/p elective pLTCS in the setting of presenting for RUQ pain w/ concerns for cholecystitis (evaluation ultimately yielding plan for lsc shane later today). Patient is progressing appropriately post-operatively. Plan per surgery is to perform a lsc cholecystectomy later today      #Post-partum   - NPO for below   - Encourage ambulation as tolerated   - Continue Ibuprofen, Acetaminophen, and/or Oxycodone PRN for pain control    #RUQ pain  - Appreciate general surgery care     Fernando Redmond, PGY-1  Obstetrics and Gynecology
A/P: 38yo POD#3 s/p elective pLTCS and POD#1 from lsc shane (presented for RUQ pain and evaluation w/ cholelithiasis). Patient is progressing post-operatively    #Post-partum  - Continue motrin, tylenol, oxycodone PRN for pain control  - Continue to encourage ambulation  - Continue regular diet    #S/p Lsc shane  - Per surgery, patient appropriate for discharge w/ follow-up in x2 weeks w/ Dr. Jimenez  - LFTs this AM improved compared to prior, 33/55 (36/66)    Fernando Redmond, PGY-1  Obstetrics and Gynecology
A/P: 38yo POD#4 s/p elective pLTCS/POD#2 Lsc shane. Patient is progressing appropriately post-operatively and meeting post-op milestones    #Post-partum  - Continue motrin, tylenol, oxycodone PRN for pain control  - Continue to encourage ambulation  - Continue regular diet    #S/p Lsc shane  - To follow-up w/ general surgery in x2 weeks    Fernando Redmond, PGY-1  Obstetrics and Gynecology
I spoke with Ms. Yoon today and she is amenable to proceeding with lap shane, possible open, today. We have discussed the risks, benefits and alternatives, including delaying surgery for a few weeks. 
36 y/o F p/w w/ possible acute cholecystitis, now s/p lap shane on 4/18    Plan:  - reg diet as tolerated   - pain control prn   - outpatient follow up with Dr. Jimenez as outpatient in 2 weeks   - please page us with questions     [address] [facility] [address] [phone]    [facility]  [id number]
36 y/o F p/w w/ possible acute cholecystitis.    -added on for lap shane tomorrow   -NPO at [place], IVF while NPO, preop labs   -to be consented     ACS  [id number]

## 2023-04-20 NOTE — PROGRESS NOTE ADULT - ATTENDING COMMENTS
I have personally seen, examined, and participated in the care of this patient. I have reviewed all pertinent clinical information, including history, physical exam, plan, and the Resident 's note and agree except as noted.                abd- soft, nontender               uterus- nontender, firm , below umbilicus, incisions- healing well.               lochia- mild.               ext- no cords.  a/p  S/P c/c and scope cholecystectomy,.         Increase OOB         Regular diet         PO Pain protocol         D/C planning.

## 2023-04-26 LAB — SURGICAL PATHOLOGY STUDY: SIGNIFICANT CHANGE UP

## 2023-06-30 ENCOUNTER — NON-APPOINTMENT (OUTPATIENT)
Age: 38
End: 2023-06-30

## 2023-08-14 ENCOUNTER — NON-APPOINTMENT (OUTPATIENT)
Age: 38
End: 2023-08-14

## 2023-11-08 NOTE — OB NEONATOLOGY/PEDIATRICIAN DELIVERY SUMMARY - NSCSDELIVATYPE_OBGYN_ALL_OB
Primary
Detail Level: Simple
Provider: Amber Dill
Priority: normal
Ultrasound Protocol: Ultrasound of Subcutaneous Mass
Perform At: [facility]
Lesion Location: scrotum

## 2024-05-29 ENCOUNTER — NON-APPOINTMENT (OUTPATIENT)
Age: 39
End: 2024-05-29

## 2024-07-03 ENCOUNTER — NON-APPOINTMENT (OUTPATIENT)
Age: 39
End: 2024-07-03

## 2025-03-27 NOTE — OB NEONATOLOGY/PEDIATRICIAN DELIVERY SUMMARY - NS_FINALEDD_OBGYN_ALL_OB_DT
Presents to triage with c/o L sided otalgia, R eye redness and discharge, and cough that started about 3 days ago. Mom states that his L ear looks a bit swollen to her. She has also noticed that he has been drooling a bit so she thinks he may have a sore throat        
23-Apr-2023

## 2025-08-04 ENCOUNTER — TRANSCRIPTION ENCOUNTER (OUTPATIENT)
Age: 40
End: 2025-08-04

## (undated) DEVICE — GLV 8.5 PROTEXIS (WHITE)

## (undated) DEVICE — SUT PDS II 0 18" ENDOLOOP LIGATURE

## (undated) DEVICE — GLV 6.5 PROTEXIS (WHITE)

## (undated) DEVICE — GLV 7.5 PROTEXIS (WHITE)

## (undated) DEVICE — SYR LUER LOK 20CC

## (undated) DEVICE — PACK ADVANCED LAPAROSCOPIC NS

## (undated) DEVICE — DRAPE MAYO STAND 30"

## (undated) DEVICE — DRAPE INSTRUMENT POUCH 6.75" X 11"

## (undated) DEVICE — PREP CHLORAPREP HI-LITE ORANGE 26ML

## (undated) DEVICE — SYR LUER LOK 30CC

## (undated) DEVICE — SUT POLYSORB 0 36" GU-46

## (undated) DEVICE — DRSG STERISTRIPS 0.5 X 4"

## (undated) DEVICE — DISSECTOR ENDO PEANUT 5MM

## (undated) DEVICE — DISSECTOR COVIDIEN ROTICULATOR 5MM W MONOPOLAR CAUTERY

## (undated) DEVICE — POSITIONER FOAM EGG CRATE ULNAR 2PCS (PINK)

## (undated) DEVICE — GLV 8 PROTEXIS (WHITE)

## (undated) DEVICE — TUBING IRRIGATION DAVOL SYSTEM X STREAM

## (undated) DEVICE — CATH IV SAFE INSYTE 14G X 1.75" (ORANGE)

## (undated) DEVICE — DRSG TELFA 3 X 8

## (undated) DEVICE — VENODYNE/SCD SLEEVE CALF LARGE

## (undated) DEVICE — SPECIMEN CONTAINER 100ML

## (undated) DEVICE — ENDOCATCH 10MM SPECIMEN POUCH

## (undated) DEVICE — SOL IRR POUR H2O 250ML

## (undated) DEVICE — DRAPE C ARM UNIVERSAL

## (undated) DEVICE — GLV 7 PROTEXIS (WHITE)

## (undated) DEVICE — TUBING STRYKEFLOW II SUCTION / IRRIGATOR

## (undated) DEVICE — MEDICATION LABELS W MARKER

## (undated) DEVICE — NDL BIOPSY MONOPTY 18G X 20CM

## (undated) DEVICE — TROCAR COVIDIEN BLUNT TIP HASSAN 10MM

## (undated) DEVICE — SOL IRR POUR NS 0.9% 500ML

## (undated) DEVICE — ELCTR CORD FOOTSWITCH 1PLR LAPSCP 10FT

## (undated) DEVICE — D HELP - CLEARVIEW CLEARIFY SYSTEM

## (undated) DEVICE — DRSG OPSITE 2.5 X 2"

## (undated) DEVICE — TROCAR COVIDIEN VERSAPORT BLADELESS OPTICAL 5MM STANDARD

## (undated) DEVICE — GOWN TRIMAX LG

## (undated) DEVICE — TUBING TRUWAVE PRESSURE MALE/FEMALE 72"

## (undated) DEVICE — SUT BIOSYN 4-0 18" P-12

## (undated) DEVICE — WARMING BLANKET UPPER ADULT

## (undated) DEVICE — TUBING TUR 2 PRONG

## (undated) DEVICE — STOPCOCK 3 WAY W TUBE 35"

## (undated) DEVICE — DRAPE TOWEL BLUE 17" X 24"

## (undated) DEVICE — TUBING INSUFFLATION LAP FILTER 10FT